# Patient Record
Sex: FEMALE | Race: WHITE | NOT HISPANIC OR LATINO | Employment: OTHER | URBAN - METROPOLITAN AREA
[De-identification: names, ages, dates, MRNs, and addresses within clinical notes are randomized per-mention and may not be internally consistent; named-entity substitution may affect disease eponyms.]

---

## 2017-03-01 ENCOUNTER — TELEPHONE (OUTPATIENT)
Dept: ORTHOPEDICS | Facility: CLINIC | Age: 57
End: 2017-03-01

## 2017-03-01 NOTE — TELEPHONE ENCOUNTER
I spoke with the patient and informed her that Dr. Moore was out of the office this week. I offered to make an appointment for when he returns but she declined and stated she been suffering with this for to long and wanted to see someone sooner.

## 2017-03-01 NOTE — TELEPHONE ENCOUNTER
----- Message from Iraida Salazar sent at 3/1/2017 10:38 AM [facility] -----  No. [phone]   New Patient has tennis elbow.  She would wilson an appointment today.  Please call.

## 2017-03-03 ENCOUNTER — TELEPHONE (OUTPATIENT)
Dept: ORTHOPEDICS | Facility: CLINIC | Age: 57
End: 2017-03-03

## 2017-03-03 NOTE — TELEPHONE ENCOUNTER
----- Message from Mariaelena Maya sent at 3/2/2017  2:03 PM CST -----  Contact: self, [phone]  Patient requests to speak with someone in your office to schedule an appointment. Please advise.

## 2017-03-03 NOTE — TELEPHONE ENCOUNTER
Spoke with pt, scheduled appt as requested. Pt provided with physical address. Pt placed on waiting list. Understanding verbalized.

## 2017-03-13 ENCOUNTER — OFFICE VISIT (OUTPATIENT)
Dept: ORTHOPEDICS | Facility: CLINIC | Age: 57
End: 2017-03-13
Payer: COMMERCIAL

## 2017-03-13 ENCOUNTER — HOSPITAL ENCOUNTER (OUTPATIENT)
Dept: RADIOLOGY | Facility: HOSPITAL | Age: 57
Discharge: HOME | End: 2017-03-13
Attending: ORTHOPAEDIC SURGERY
Payer: COMMERCIAL

## 2017-03-13 VITALS — WEIGHT: 148 LBS | HEIGHT: 64 IN | BODY MASS INDEX: 25.27 KG/M2

## 2017-03-13 DIAGNOSIS — M25.522 PAIN OF BOTH ELBOWS: Primary | ICD-10-CM

## 2017-03-13 DIAGNOSIS — M25.521 PAIN OF BOTH ELBOWS: ICD-10-CM

## 2017-03-13 DIAGNOSIS — M77.12 LATERAL EPICONDYLITIS OF BOTH ELBOWS: ICD-10-CM

## 2017-03-13 DIAGNOSIS — M25.522 PAIN OF BOTH ELBOWS: ICD-10-CM

## 2017-03-13 DIAGNOSIS — M77.11 LATERAL EPICONDYLITIS OF BOTH ELBOWS: ICD-10-CM

## 2017-03-13 DIAGNOSIS — M25.521 PAIN OF BOTH ELBOWS: Primary | ICD-10-CM

## 2017-03-13 PROCEDURE — 73070 X-RAY EXAM OF ELBOW: CPT | Mod: TC,50

## 2017-03-13 PROCEDURE — 73070 X-RAY EXAM OF ELBOW: CPT | Mod: 26,50,, | Performed by: RADIOLOGY

## 2017-03-13 PROCEDURE — 99203 OFFICE O/P NEW LOW 30 MIN: CPT | Mod: 25,S$GLB,, | Performed by: ORTHOPAEDIC SURGERY

## 2017-03-13 PROCEDURE — 20551 NJX 1 TENDON ORIGIN/INSJ: CPT | Mod: LT,S$GLB,, | Performed by: ORTHOPAEDIC SURGERY

## 2017-03-13 PROCEDURE — 1160F RVW MEDS BY RX/DR IN RCRD: CPT | Mod: S$GLB,,, | Performed by: ORTHOPAEDIC SURGERY

## 2017-03-13 PROCEDURE — 99999 PR PBB SHADOW E&M-EST. PATIENT-LVL II: CPT | Mod: PBBFAC,,, | Performed by: ORTHOPAEDIC SURGERY

## 2017-03-13 RX ORDER — CELECOXIB 200 MG/1
200 CAPSULE ORAL DAILY
Qty: 30 CAPSULE | Refills: 3 | Status: SHIPPED | OUTPATIENT
Start: 2017-03-13 | End: 2017-04-13

## 2017-03-13 RX ORDER — TRIAMCINOLONE ACETONIDE 40 MG/ML
40 INJECTION, SUSPENSION INTRA-ARTICULAR; INTRAMUSCULAR
Status: COMPLETED | OUTPATIENT
Start: 2017-03-13 | End: 2017-03-13

## 2017-03-13 RX ADMIN — TRIAMCINOLONE ACETONIDE 40 MG: 40 INJECTION, SUSPENSION INTRA-ARTICULAR; INTRAMUSCULAR at 02:03

## 2017-03-13 NOTE — PROGRESS NOTES
INITIAL VISIT HISTORY:  A 56-year-old female presents for evaluation of   bilateral elbow pain for the past year or so.  Symptoms have gotten worse   lately, especially since she has been doing a lot of Pilates and yoga.  Left   elbow seems to be worse than the right and although it does seem to change sides   from time to time.  No numbness or tingling is reported.  No trauma reported.    PAST MEDICAL HISTORY:  Unremarkable.    PAST SURGICAL HISTORY:  Includes hysterectomy.    FAMILY HISTORY:  Negative.    SOCIAL HISTORY:  The patient is a former smoker, drinks alcohol occasionally.    REVIEW OF SYSTEMS:  Negative fever, chills, rashes.    CURRENT MEDICATIONS:  Reviewed on chart.    ALLERGIES:  Benadryl.    PHYSICAL EXAMINATION:  GENERAL:  Well-developed, well-nourished female in no acute distress, alert and   oriented x3.  EXTREMITIES:  Examination of the upper extremities significant for the elbows,   left elbow, demonstrating tenderness laterally over the lateral epicondylar   area.  Full range of motion, no instability, no clicking, some pain with   resisted wrist extension noted.  The right elbow demonstrates tenderness mostly   medially.  Again, no swelling, full range of motion, no instability.  Good   strength.  Tinel sign is negative over the ulnar nerve on both elbows.    Distally full range of motion in wrist and fingers.  Sensation intact in all   digits.    X-RAYS:  AP and lateral, bilateral elbows, demonstrate some slight   calcifications noted in both elbows mostly laterally.    IMPRESSION:  Lateral epicondylitis, bilateral with small calcifications.    PLAN:  I explained the nature of problem to the patient.  Recommended and   performed injections each elbow, the left elbow laterally, the right elbow   medially in each case with combination of Kenalog 10 mg, 0.5 mL Xylocaine,   sterile technique.    I have also recommended some anti-inflammatory medication by mouth and   modification of her workout  routine, so she puts a little bit less stress on the   elbows.  Follow up in 1-2 months for recheck.      ROSANNA  dd: 03/13/2017 14:59:15 (CDT)  td: 03/14/2017 07:35:52 (CDT)  Doc ID   #[id number]  Job ID #[id number]    CC:

## 2017-03-13 NOTE — LETTER
March 13, 2017        Marcel Brown MD  [address]  [place] [address]             [facility]  [address] [place] [address] [address]  [facility] [address]  Phone: [phone]   Patient: Mago Cleaning   MR Number: [id number]   YOB: 1960   Date of Visit: 3/13/2017       Dear Dr. Brown:    Thank you for referring Mago Cleaning to me for evaluation. Below are the relevant portions of my assessment and plan of care.            If you have questions, please do not hesitate to call me. I look forward to following Mago along with you.    Sincerely,      Grant Moore Jr., MD           CC  No Recipients

## 2017-03-28 ENCOUNTER — TELEPHONE (OUTPATIENT)
Dept: SMOKING CESSATION | Facility: CLINIC | Age: 57
End: 2017-03-28

## 2017-04-13 ENCOUNTER — OFFICE VISIT (OUTPATIENT)
Dept: ORTHOPEDICS | Facility: CLINIC | Age: 57
End: 2017-04-13
Payer: COMMERCIAL

## 2017-04-13 VITALS — WEIGHT: 147.94 LBS | BODY MASS INDEX: 25.25 KG/M2 | HEIGHT: 64 IN

## 2017-04-13 DIAGNOSIS — M77.12 LATERAL EPICONDYLITIS OF BOTH ELBOWS: Primary | ICD-10-CM

## 2017-04-13 DIAGNOSIS — M77.11 LATERAL EPICONDYLITIS OF BOTH ELBOWS: Primary | ICD-10-CM

## 2017-04-13 PROCEDURE — 99213 OFFICE O/P EST LOW 20 MIN: CPT | Mod: S$GLB,,, | Performed by: ORTHOPAEDIC SURGERY

## 2017-04-13 PROCEDURE — 1160F RVW MEDS BY RX/DR IN RCRD: CPT | Mod: S$GLB,,, | Performed by: ORTHOPAEDIC SURGERY

## 2017-04-13 PROCEDURE — 99999 PR PBB SHADOW E&M-EST. PATIENT-LVL II: CPT | Mod: PBBFAC,,, | Performed by: ORTHOPAEDIC SURGERY

## 2017-04-13 RX ORDER — DICLOFENAC SODIUM 10 MG/G
2 GEL TOPICAL 3 TIMES DAILY
Qty: 1 TUBE | Refills: 3 | Status: SHIPPED | OUTPATIENT
Start: 2017-04-13 | End: 2023-12-20

## 2017-04-13 NOTE — PROGRESS NOTES
HISTORY OF PRESENT ILLNESS:  Ms. Cleaning in followup of bilateral elbow   symptoms related to epicondylitis.  She is doing better.  The last injections   helped out quite a bit.  We performed injections each elbow.    She just has occasional soreness bilaterally, but not too bad.  Symptoms worse   with overuse.  No numbness or tingling reported.  No trauma reported.    PHYSICAL EXAMINATION:  Both elbows look good.  Full range of motion.  Really no   tenderness either elbow.    IMPRESSION:  Bilateral elbow pain, improved.    PLAN:  I have recommended she just keep an eye on symptoms for now.  I have   ordered some Voltaren gel topically especially since she wants to get away from   the Celebrex because of the possible side effects.  So, we will discontinue the   Celebrex.  We will start some Voltaren gel topically and just have her keep an   eye on symptoms.  Follow up in a month or two, and if symptoms get worse, then   we may repeat the injections at that time.      ROSANNA  dd: 04/13/2017 11:18:04 (CDT)  td: 04/14/2017 02:24:13 (CDT)  Doc ID   #[id number]  Job ID #[id number]    CC:

## 2017-04-26 ENCOUNTER — TELEPHONE (OUTPATIENT)
Dept: ORTHOPEDICS | Facility: CLINIC | Age: 57
End: 2017-04-26

## 2017-04-26 NOTE — TELEPHONE ENCOUNTER
----- Message from Maria Fernanda Luo sent at 4/26/2017 10:54 AM CDT -----  Contact: self/[phone]  Patient called to ask about the shot.  Please advise.

## 2017-05-02 ENCOUNTER — OFFICE VISIT (OUTPATIENT)
Dept: ORTHOPEDICS | Facility: CLINIC | Age: 57
End: 2017-05-02
Payer: COMMERCIAL

## 2017-05-02 VITALS — WEIGHT: 147 LBS | BODY MASS INDEX: 25.1 KG/M2 | HEIGHT: 64 IN

## 2017-05-02 DIAGNOSIS — M77.12 LATERAL EPICONDYLITIS OF LEFT ELBOW: Primary | ICD-10-CM

## 2017-05-02 DIAGNOSIS — M77.11 LATERAL EPICONDYLITIS OF BOTH ELBOWS: ICD-10-CM

## 2017-05-02 DIAGNOSIS — M77.12 LATERAL EPICONDYLITIS OF BOTH ELBOWS: ICD-10-CM

## 2017-05-02 PROCEDURE — 99999 PR PBB SHADOW E&M-EST. PATIENT-LVL II: CPT | Mod: PBBFAC,,, | Performed by: ORTHOPAEDIC SURGERY

## 2017-05-02 PROCEDURE — 99213 OFFICE O/P EST LOW 20 MIN: CPT | Mod: 25,S$GLB,, | Performed by: ORTHOPAEDIC SURGERY

## 2017-05-02 PROCEDURE — 20551 NJX 1 TENDON ORIGIN/INSJ: CPT | Mod: LT,S$GLB,, | Performed by: ORTHOPAEDIC SURGERY

## 2017-05-02 PROCEDURE — 1160F RVW MEDS BY RX/DR IN RCRD: CPT | Mod: S$GLB,,, | Performed by: ORTHOPAEDIC SURGERY

## 2017-05-02 RX ORDER — TRIAMCINOLONE ACETONIDE 40 MG/ML
40 INJECTION, SUSPENSION INTRA-ARTICULAR; INTRAMUSCULAR
Status: DISCONTINUED | OUTPATIENT
Start: 2017-05-02 | End: 2017-05-02

## 2017-05-02 RX ORDER — TRIAMCINOLONE ACETONIDE 40 MG/ML
40 INJECTION, SUSPENSION INTRA-ARTICULAR; INTRAMUSCULAR
Status: COMPLETED | OUTPATIENT
Start: 2017-05-02 | End: 2017-05-02

## 2017-05-02 RX ADMIN — TRIAMCINOLONE ACETONIDE 40 MG: 40 INJECTION, SUSPENSION INTRA-ARTICULAR; INTRAMUSCULAR at 09:05

## 2017-05-02 NOTE — PROGRESS NOTES
HISTORY OF PRESENT ILLNESS:  Ms. Cleaning in followup of bilateral elbow pain   related to epicondylitis.  She had an injection a few months ago with excellent   results of the right elbow, but still having some pain in the left elbow.    PHYSICAL EXAMINATION:  RIGHT ELBOW:  No tenderness.  Full range of motion.  LEFT ELBOW:  Demonstrates tenderness laterally, slight swelling and some pain on   terminal extension.  No instability.  Strength is a little bit weak on the left   compared to the right.    IMPRESSION:  Lateral epicondylitis, left elbow.    PLAN:  I have recommended and performed an injection left elbow, combination of   Kenalog 10 mg, 0.5 mL Xylocaine, sterile technique.  I think we should also   follow this up with some therapy, so I have ordered some therapy for the left   elbow for stretching, strengthening and modality treatments, continue   anti-inflammatory medication by mouth and some Ultram given for pain.  Follow up   in six weeks.      ROSANNA  dd: 05/02/2017 09:23:08 (CDT)  td: 05/03/2017 07:06:05 (CDT)  Doc ID   #[id number]  Job ID #[id number]    CC:

## 2017-06-13 ENCOUNTER — TELEPHONE (OUTPATIENT)
Dept: ORTHOPEDICS | Facility: CLINIC | Age: 57
End: 2017-06-13

## 2017-06-13 ENCOUNTER — OFFICE VISIT (OUTPATIENT)
Dept: ORTHOPEDICS | Facility: CLINIC | Age: 57
End: 2017-06-13
Payer: COMMERCIAL

## 2017-06-13 VITALS — WEIGHT: 147 LBS | HEIGHT: 64 IN | BODY MASS INDEX: 25.1 KG/M2

## 2017-06-13 DIAGNOSIS — M77.12 LATERAL EPICONDYLITIS OF BOTH ELBOWS: ICD-10-CM

## 2017-06-13 DIAGNOSIS — M77.11 LATERAL EPICONDYLITIS OF BOTH ELBOWS: ICD-10-CM

## 2017-06-13 DIAGNOSIS — M25.522 LEFT ELBOW PAIN: Primary | ICD-10-CM

## 2017-06-13 PROCEDURE — 99213 OFFICE O/P EST LOW 20 MIN: CPT | Mod: S$GLB,,, | Performed by: ORTHOPAEDIC SURGERY

## 2017-06-13 PROCEDURE — 99999 PR PBB SHADOW E&M-EST. PATIENT-LVL II: CPT | Mod: PBBFAC,,, | Performed by: ORTHOPAEDIC SURGERY

## 2017-06-13 NOTE — TELEPHONE ENCOUNTER
Spoke with patient to confirm appointment time. Patient verbalized understanding.    ----- Message from Iraida Salazar sent at 6/13/2017  8:42 AM CDT -----  No. [phone]    Patient returned your call.

## 2017-06-13 NOTE — PROGRESS NOTES
Ms. Cleaning in followup of bilateral elbow symptoms related to lateral   epicondylitis.  She is doing well with the right elbow, but continues to have   problems with the left elbow.  She has had two injections and tried the Voltaren   gel and some stretching exercises without improvement.  She is concerned about   ongoing symptoms, we have discussed surgery previously.    PHYSICAL EXAMINATION:  There is some tenderness laterally.  No swelling, full   range of motion, no instability.  A little bit of pain with resisted wrist   extension.  Tinel sign negative.    IMPRESSION:  Probable lateral epicondylitis left elbow, unresponsive to   conservative treatment.    PLAN:  We did discuss surgery as an option today, but the patient is very   reluctant to proceed with surgery.  I think she is interested in MRI and we   discussed that as well.  This could give us more information about the   epicondylitis, or whether she has significant problem that would require   surgery.  She is in agreement.  We will set this up as an MRI of left elbow.  In   the meantime, continue anti-inflammatory medication by mouth and stretching   exercises.  Followup after MRI is complete.      ROSANNA  dd: 06/13/2017 10:08:55 (CDT)  td: 06/14/2017 06:19:26 (CDT)  Doc ID   #[id number]  Job ID #[id number]    CC:

## 2017-06-14 ENCOUNTER — TELEPHONE (OUTPATIENT)
Dept: ORTHOPEDICS | Facility: CLINIC | Age: 57
End: 2017-06-14

## 2017-06-14 NOTE — TELEPHONE ENCOUNTER
I spoke with the patient and she informed me that [facility] wanted $1800 for her MRI. She requested for the order be faxed to her so she could go somewhere else. It was faxed to [email or web address].

## 2017-06-14 NOTE — TELEPHONE ENCOUNTER
----- Message from Irasema Preston sent at 6/14/2017  2:04 PM CDT -----  Contact: [phone]/self  Patient requesting to speak with you regarding external orders of MRI. Please advise.

## 2017-06-20 ENCOUNTER — OFFICE VISIT (OUTPATIENT)
Dept: ORTHOPEDICS | Facility: CLINIC | Age: 57
End: 2017-06-20
Payer: COMMERCIAL

## 2017-06-20 VITALS — BODY MASS INDEX: 25.1 KG/M2 | WEIGHT: 147 LBS | HEIGHT: 64 IN

## 2017-06-20 DIAGNOSIS — M77.12 LATERAL EPICONDYLITIS OF BOTH ELBOWS: Primary | ICD-10-CM

## 2017-06-20 DIAGNOSIS — M77.11 LATERAL EPICONDYLITIS OF BOTH ELBOWS: Primary | ICD-10-CM

## 2017-06-20 PROCEDURE — 99214 OFFICE O/P EST MOD 30 MIN: CPT | Mod: S$GLB,,, | Performed by: ORTHOPAEDIC SURGERY

## 2017-06-20 PROCEDURE — 99999 PR PBB SHADOW E&M-EST. PATIENT-LVL III: CPT | Mod: PBBFAC,,, | Performed by: ORTHOPAEDIC SURGERY

## 2017-06-20 NOTE — PROGRESS NOTES
CHIEF COMPLAINT:  Left tennis elbow.    HISTORY OF PRESENT ILLNESS:  A 56-year-old female with ongoing symptoms, left   elbow for about 9 to 10 months' duration.  We have tried several injections,   exercises, and a counterforce brace without improvement.  The patient had an MRI   recently left elbow, which showed evidence of micro-tear of the lateral complex   consistent with tennis elbow.  She would like to set up surgery for left elbow.    PAST MEDICAL HISTORY:  Unremarkable.    PAST SURGICAL HISTORY:  Includes hysterectomy.    FAMILY HISTORY:  Negative.    SOCIAL HISTORY:  The patient is a former smoker, drinks alcohol occasionally.    REVIEW OF SYSTEMS:  Negative for fever, chills, rashes.    CURRENT MEDICATIONS:  Reviewed on the chart.    ALLERGIES:  Benadryl.    PHYSICAL EXAMINATION:  GENERAL:  Well-developed, well-nourished female in no acute distress, alert and   oriented x3.  HEENT:  Unremarkable.  LUNGS:  Clear to auscultation.  HEART:  Regular rate and rhythm.  ABDOMEN:  Soft, nontender.  EXTREMITIES:  Significant for left elbow, demonstrating tenderness laterally   over the lateral epicondylar area.  Range of motion of left elbow is slightly   painful.  She has some pain on resisted wrist extension.  No instability.  No   tenderness medially.  Tinel's sign negative.    MRI as noted above.    IMPRESSION:  Lateral epicondylitis, chronic left elbow.    PLAN:  The patient has elected to proceed with surgery.  This will include   lateral epicondylar release and repair.  The risks and benefits of surgery   explained to the patient and she understands.    Additionally, she has been having some issues with her insurance company, so she   may choose to have this done outside of the [facility].  I have given her   the name of Dr. Claude Williams and if she chooses, she will follow up with him   either way.  I think she would benefit from the surgery and we discussed that   today.  She understands.      LILIA/ROSI   dd: 06/20/2017 09:47:58 (CDT)  td: 06/21/2017 03:08:53 (VANESA)  Doc ID   #[id number]  Job ID #[id number]    CC:

## 2017-06-26 ENCOUNTER — TELEPHONE (OUTPATIENT)
Dept: ORTHOPEDICS | Facility: CLINIC | Age: 57
End: 2017-06-26

## 2017-06-26 NOTE — TELEPHONE ENCOUNTER
I spoke with the patient and she stated she will cancel surgery due to her insurance. Mrs. Dixon was made aware of this.

## 2017-06-26 NOTE — TELEPHONE ENCOUNTER
----- Message from Irasema Preston sent at 6/26/2017  3:28 PM CDT -----  Contact: [phone]/self  Patient requesting to speak with you regarding canceling surgery. Please advise.

## 2017-07-12 ENCOUNTER — TELEPHONE (OUTPATIENT)
Dept: ORTHOPEDICS | Facility: CLINIC | Age: 57
End: 2017-07-12

## 2017-07-12 NOTE — TELEPHONE ENCOUNTER
----- Message from Nicolette Palomares sent at 7/12/2017 10:03 AM CDT -----  Contact: Self [phone]  Patient Returning Your Phone Call

## 2017-07-12 NOTE — TELEPHONE ENCOUNTER
I spoke with the patient and gave her the codes for her office visit and also the number to billing. She understood.

## 2018-04-25 ENCOUNTER — CLINICAL SUPPORT (OUTPATIENT)
Dept: SMOKING CESSATION | Facility: CLINIC | Age: 58
End: 2018-04-25
Payer: COMMERCIAL

## 2018-04-25 DIAGNOSIS — F17.200 NICOTINE DEPENDENCE: Primary | ICD-10-CM

## 2018-04-25 PROCEDURE — 99407 BEHAV CHNG SMOKING > 10 MIN: CPT | Mod: S$GLB,,, | Performed by: INTERNAL MEDICINE

## 2018-11-11 ENCOUNTER — OFFICE VISIT (OUTPATIENT)
Dept: URGENT CARE | Facility: CLINIC | Age: 58
End: 2018-11-11
Payer: COMMERCIAL

## 2018-11-11 VITALS
WEIGHT: 150 LBS | TEMPERATURE: 98 F | DIASTOLIC BLOOD PRESSURE: 74 MMHG | OXYGEN SATURATION: 97 % | HEART RATE: 59 BPM | RESPIRATION RATE: 18 BRPM | HEIGHT: 64 IN | SYSTOLIC BLOOD PRESSURE: 122 MMHG | BODY MASS INDEX: 25.61 KG/M2

## 2018-11-11 DIAGNOSIS — J06.9 UPPER RESPIRATORY TRACT INFECTION, UNSPECIFIED TYPE: Primary | ICD-10-CM

## 2018-11-11 LAB
CTP QC/QA: YES
FLUAV AG NPH QL: NEGATIVE
FLUBV AG NPH QL: NEGATIVE

## 2018-11-11 PROCEDURE — 87804 INFLUENZA ASSAY W/OPTIC: CPT | Mod: QW,S$GLB,, | Performed by: FAMILY MEDICINE

## 2018-11-11 PROCEDURE — 99213 OFFICE O/P EST LOW 20 MIN: CPT | Mod: S$GLB,,, | Performed by: FAMILY MEDICINE

## 2018-11-11 PROCEDURE — 3008F BODY MASS INDEX DOCD: CPT | Mod: CPTII,S$GLB,, | Performed by: FAMILY MEDICINE

## 2018-11-11 RX ORDER — VALACYCLOVIR HYDROCHLORIDE 1 G/1
TABLET, FILM COATED ORAL
COMMUNITY
Start: 2018-10-05

## 2018-11-11 RX ORDER — ESTRADIOL 1 MG/1
TABLET ORAL
COMMUNITY
Start: 2018-10-15 | End: 2023-06-13

## 2018-11-11 NOTE — PROGRESS NOTES
"Subjective:       Patient ID: Mago Cleaning is a 57 y.o. female.    Vitals:  height is 5' 4" (1.626 m) and weight is 68 kg (150 lb). Her oral temperature is 98.2 °F (36.8 °C). Her blood pressure is 122/74 and her pulse is 59 (abnormal). Her respiration is 18 and oxygen saturation is 97%.     Chief Complaint: Sinus Problem    This is a 57 y.o. female who presents today with a chief complaint of hoarseness and body aches. [place] [employer] [place] last week[place]end. Symptoms began Monday. She took 2 Aspirin 325 mg.       Sore Throat    This is a new problem. The current episode started in the past 7 days. The problem has been unchanged. Neither side of throat is experiencing more pain than the other. There has been no fever. Associated symptoms include congestion, coughing and a hoarse voice. Pertinent negatives include no abdominal pain, ear pain, headaches, plugged ear sensation, neck pain, shortness of breath, swollen glands or trouble swallowing. The treatment provided no relief.     Review of Systems   Constitution: Negative for chills, fever and malaise/fatigue.   HENT: Positive for congestion, hoarse voice and sore throat. Negative for ear pain and trouble swallowing.    Eyes: Negative for discharge and redness.   Cardiovascular: Negative for chest pain, dyspnea on exertion and leg swelling.   Respiratory: Positive for cough and sputum production. Negative for shortness of breath and wheezing.    Musculoskeletal: Negative for myalgias and neck pain.   Gastrointestinal: Negative for abdominal pain and nausea.   Neurological: Negative for headaches.       Objective:      Physical Exam   Constitutional: She appears well-developed and well-nourished.   HENT:   Head: Normocephalic and atraumatic.   Mouth/Throat: Posterior oropharyngeal erythema (mild) present.   Eyes: EOM are normal. Pupils are equal, round, and reactive to light.   Neck: Normal range of motion. Neck supple.   Cardiovascular: Normal rate and regular " rhythm.   Pulmonary/Chest: Effort normal and breath sounds normal.   Abdominal: Soft.   Lymphadenopathy:     She has no cervical adenopathy.   Nursing note and vitals reviewed.      Results for orders placed or performed in visit on 11/11/18   POCT Influenza A/B   Result Value Ref Range    Rapid Influenza A Ag Negative Negative    Rapid Influenza B Ag Negative Negative     Acceptable Yes      Assessment:       1. Upper respiratory tract infection, unspecified type        Plan:         Upper respiratory tract infection, unspecified type  -     POCT Influenza A/B    OTC analgesia. RTC prn worsening symptoms

## 2018-11-11 NOTE — PATIENT INSTRUCTIONS
Viral Upper Respiratory Illness (Adult)  You have a viral upper respiratory illness (URI), which is another term for the common cold. This illness is contagious during the first few days. It is spread through the air by coughing and sneezing. It may also be spread by direct contact (touching the sick person and then touching your own eyes, nose, or mouth). Frequent handwashing will decrease risk of spread. Most viral illnesses go away within 7 to 10 days with rest and simple home remedies. Sometimes the illness may last for several weeks. Antibiotics will not kill a virus, and they are generally not prescribed for this condition.    Home care  · If symptoms are severe, rest at home for the first 2 to 3 days. When you resume activity, don't let yourself get too tired.  · Avoid being exposed to cigarette smoke (yours or others).  · You may use acetaminophen or ibuprofen to control pain and fever, unless another medicine was prescribed. (Note: If you have chronic liver or kidney disease, have ever had a stomach ulcer or gastrointestinal bleeding, or are taking blood-thinning medicines, talk with your healthcare provider before using these medicines.) Aspirin should never be given to anyone under 18 years of age who is ill with a viral infection or fever. It may cause severe liver or brain damage.  · Your appetite may be poor, so a light diet is fine. Avoid dehydration by drinking 6 to 8 glasses of fluids per day (water, soft drinks, juices, tea, or soup). Extra fluids will help loosen secretions in the nose and lungs.  · Over-the-counter cold medicines will not shorten the length of time youre sick, but they may be helpful for the following symptoms: cough, sore throat, and nasal and sinus congestion. (Note: Do not use decongestants if you have high blood pressure.)  Follow-up care  Follow up with your healthcare provider, or as advised.  When to seek medical advice  Call your healthcare provider right away if any  of these occur:  · Cough with lots of colored sputum (mucus)  · Severe headache; face, neck, or ear pain  · Difficulty swallowing due to throat pain  · Fever of 100.4°F (38°C)  Call 911, or get immediate medical care  Call emergency services right away if any of these occur:  · Chest pain, shortness of breath, wheezing, or difficulty breathing  · Coughing up blood  · Inability to swallow due to throat pain  Date Last Reviewed: 9/13/2015  © 9259-3712 [employer]. [address] [place] [address]. All rights reserved. This information is not intended as a substitute for professional medical care. Always follow your healthcare professional's instructions.

## 2018-12-15 ENCOUNTER — OFFICE VISIT (OUTPATIENT)
Dept: URGENT CARE | Facility: CLINIC | Age: 58
End: 2018-12-15
Payer: COMMERCIAL

## 2018-12-15 VITALS
OXYGEN SATURATION: 98 % | HEIGHT: 64 IN | RESPIRATION RATE: 20 BRPM | HEART RATE: 61 BPM | DIASTOLIC BLOOD PRESSURE: 68 MMHG | TEMPERATURE: 98 F | WEIGHT: 150 LBS | SYSTOLIC BLOOD PRESSURE: 115 MMHG | BODY MASS INDEX: 25.61 KG/M2

## 2018-12-15 DIAGNOSIS — J06.9 UPPER RESPIRATORY TRACT INFECTION, UNSPECIFIED TYPE: Primary | ICD-10-CM

## 2018-12-15 DIAGNOSIS — J32.9 SINUSITIS, UNSPECIFIED CHRONICITY, UNSPECIFIED LOCATION: ICD-10-CM

## 2018-12-15 PROCEDURE — 3008F BODY MASS INDEX DOCD: CPT | Mod: CPTII,S$GLB,, | Performed by: PHYSICIAN ASSISTANT

## 2018-12-15 PROCEDURE — 99214 OFFICE O/P EST MOD 30 MIN: CPT | Mod: S$GLB,,, | Performed by: PHYSICIAN ASSISTANT

## 2018-12-15 NOTE — PATIENT INSTRUCTIONS
- Rest.    - Drink plenty of fluids.    - Tylenol or Ibuprofen as directed as needed for fever/pain.    - Take over-the-counter claritin, zyrtec, allegra, or xyzal as directed.  You may use a decongestant form (D) of this medication if you DO NOT have a history of hypertension or heart disease.  - Use over the counter Flonase as directed for sinus congestion and postnasal drip.  - use nasal saline prior to Flonase.  - Use Ocean Spray Nasal Saline 1-3 puffs each nostril every 2-3 hours then blow out onto tissue. This is to irrigate the nasal passage way to clear the sinus openings. Use until sinus problem resolved.   - Follow up with your PCP or specialty clinic as directed in the next 1-2 weeks if not improved or as needed.  You can call [phone] to schedule an appointment with the appropriate provider.    - Go to the ED if your symptoms worsen.  - You must understand that you have received an Urgent Care treatment only and that you may be released before all of your medical problems are known or treated.   - You, the patient, will arrange for follow up care as instructed.   - If your condition worsens or fails to improve we recommend that you receive another evaluation at the ER immediately or contact your PCP to discuss your concerns or return here.       Sinusitis (No Antibiotics)    The sinuses are air-filled spaces within the bones of the face. They connect to the inside of the nose. Sinusitis is an inflammation of the tissue lining the sinus cavity. Sinus inflammation can occur during a cold. It can also be due to allergies to pollens and other particles in the air. It can cause symptoms such as sinus congestion, headache, sore throat, facial swelling and fullness. It may also cause a low-grade fever. No infection is present, and no antibiotic treatment is needed.  Home care  · Drink plenty of water, hot tea, and other liquids. This may help thin mucus. It also may promote sinus drainage.  · Heat may  help soothe painful areas of the face. Use a towel soaked in hot water. Or,  the shower and direct the hot spray onto your face. Using a vaporizer along with a menthol rub at night may also help.   · An expectorant containing guaifenesin may help thin the mucus and promote drainage from the sinuses.  · Over-the-counter decongestants may be used unless a similar medicine was prescribed. Nasal sprays work the fastest. Use one that contains phenylephrine or oxymetazoline. First blow the nose gently. Then use the spray. Do not use these medicines more often than directed on the label or symptoms may get worse. You may also use tablets containing pseudoephedrine. Avoid products that combine ingredients, because side effects may be increased. Read labels. You can also ask the pharmacist for help. (NOTE: Persons with high blood pressure should not use decongestants. They can raise blood pressure.)  · Over-the-counter antihistamines may help if allergies contributed to your sinusitis.    · Use acetaminophen or ibuprofen to control pain, unless another pain medicine was prescribed. (If you have chronic liver or kidney disease or ever had a stomach ulcer, talk with your doctor before using these medicines. Aspirin should never be used in anyone under 18 years of age who is ill with a fever. It may cause severe liver damage.)  · Use nasal rinses or irrigation as instructed by your health care provider.  · Don't smoke. This can worsen symptoms.  Follow-up care  Follow up with your healthcare provider or our staff if you are not improving within the next week.  When to seek medical advice  Call your healthcare provider if any of these occur:  · Green or yellow discharge from the nose or into the throat  · Facial pain or headache becoming more severe  · Stiff neck  · Unusual drowsiness or confusion  · Swelling of the forehead or eyelids  · Vision problems, including blurred or double vision  · Fever of 100.4ºF (38ºC) or  higher, or as directed by your healthcare provider  · Seizure  · Breathing problems  · Symptoms not resolving within 10 days  Date Last Reviewed: 4/13/2015  © 4442-1510 [employer]. [address] [place] [address], [place] [address]. All rights reserved. This information is not intended as a substitute for professional medical care. Always follow your healthcare professional's instructions.        Viral Upper Respiratory Illness (Adult)  You have a viral upper respiratory illness (URI), which is another term for the common cold. This illness is contagious during the first few days. It is spread through the air by coughing and sneezing. It may also be spread by direct contact (touching the sick person and then touching your own eyes, nose, or mouth). Frequent handwashing will decrease risk of spread. Most viral illnesses go away within 7 to 10 days with rest and simple home remedies. Sometimes the illness may last for several weeks. Antibiotics will not kill a virus, and they are generally not prescribed for this condition.    Home care  · If symptoms are severe, rest at home for the first 2 to 3 days. When you resume activity, don't let yourself get too tired.  · Avoid being exposed to cigarette smoke (yours or others).  · You may use acetaminophen or ibuprofen to control pain and fever, unless another medicine was prescribed. (Note: If you have chronic liver or kidney disease, have ever had a stomach ulcer or gastrointestinal bleeding, or are taking blood-thinning medicines, talk with your healthcare provider before using these medicines.) Aspirin should never be given to anyone under 18 years of age who is ill with a viral infection or fever. It may cause severe liver or brain damage.  · Your appetite may be poor, so a light diet is fine. Avoid dehydration by drinking 6 to 8 glasses of fluids per day (water, soft drinks, juices, tea, or soup). Extra fluids will help loosen secretions in the nose and  lungs.  · Over-the-counter cold medicines will not shorten the length of time youre sick, but they may be helpful for the following symptoms: cough, sore throat, and nasal and sinus congestion. (Note: Do not use decongestants if you have high blood pressure.)  Follow-up care  Follow up with your healthcare provider, or as advised.  When to seek medical advice  Call your healthcare provider right away if any of these occur:  · Cough with lots of colored sputum (mucus)  · Severe headache; face, neck, or ear pain  · Difficulty swallowing due to throat pain  · Fever of 100.4°F (38°C)  Call 911, or get immediate medical care  Call emergency services right away if any of these occur:  · Chest pain, shortness of breath, wheezing, or difficulty breathing  · Coughing up blood  · Inability to swallow due to throat pain  Date Last Reviewed: 9/13/2015  © 7725-8831 [employer]. [address] [place] [address], [place] [address]. All rights reserved. This information is not intended as a substitute for professional medical care. Always follow your healthcare professional's instructions.

## 2018-12-15 NOTE — PROGRESS NOTES
"Subjective:       Patient ID: Mago Cleaning is a 57 y.o. female.    Vitals:  height is 5' 4" (1.626 m) and weight is 68 kg (150 lb). Her oral temperature is 97.5 °F (36.4 °C). Her blood pressure is 115/68 and her pulse is 61. Her respiration is 20 and oxygen saturation is 98%.     Chief Complaint: URI    This is a 57 y.o. female   with History reviewed. No pertinent past medical history.   and Past Surgical History:  No date: HYSTERECTOMY  who presents today with a chief complaint of cold symptoms she's had for the past two days.  She reports sinus congestion, body aches, and runny nose.  She says she is having difficulty breathing through her nose.  She denies fever.  She took alkaseltzer and antihistamines to help with her symptoms.       URI    This is a new problem. The current episode started in the past 7 days. The problem has been gradually worsening. There has been no fever. Associated symptoms include congestion, coughing, headaches, rhinorrhea and a sore throat. Pertinent negatives include no ear pain, nausea, rash, sinus pain, vomiting or wheezing. She has tried antihistamine (alkaseltzer ) for the symptoms. The treatment provided mild relief.       Constitution: Positive for chills and fatigue. Negative for sweating and fever.   HENT: Positive for congestion and sore throat. Negative for ear pain, sinus pain, sinus pressure and voice change.    Neck: Negative for painful lymph nodes.   Eyes: Negative for eye redness.   Respiratory: Positive for cough and sputum production. Negative for chest tightness, bloody sputum, COPD, shortness of breath, stridor, wheezing and asthma.    Gastrointestinal: Negative for nausea and vomiting.   Musculoskeletal: Positive for muscle ache.   Skin: Negative for rash and erythema.   Allergic/Immunologic: Negative for seasonal allergies and asthma.   Neurological: Positive for headaches.   Hematologic/Lymphatic: Negative for swollen lymph nodes.       Objective:    "   Physical Exam   Constitutional: She is oriented to person, place, and time. She appears well-developed and well-nourished.   HENT:   Head: Normocephalic and atraumatic.   Right Ear: Hearing, tympanic membrane, external ear and ear canal normal.   Left Ear: Hearing, tympanic membrane, external ear and ear canal normal.   Nose: Mucosal edema and rhinorrhea present. Right sinus exhibits no maxillary sinus tenderness and no frontal sinus tenderness. Left sinus exhibits no maxillary sinus tenderness and no frontal sinus tenderness.   Mouth/Throat: Uvula is midline and oropharynx is clear and moist.   Eyes: Conjunctivae are normal.   Neck: Normal range of motion. Neck supple. No thyromegaly present.   Cardiovascular: Normal rate and regular rhythm. Exam reveals no gallop and no friction rub.   No murmur heard.  Pulmonary/Chest: Effort normal and breath sounds normal. She has no wheezes. She has no rales.   Musculoskeletal: Normal range of motion.   Lymphadenopathy:     She has no cervical adenopathy.   Neurological: She is alert and oriented to person, place, and time.   Skin: Skin is warm and dry. No rash noted. No erythema.   Psychiatric: She has a normal mood and affect. Her behavior is normal. Judgment and thought content normal.   Nursing note and vitals reviewed.      Assessment:       1. Upper respiratory tract infection, unspecified type    2. Sinusitis, unspecified chronicity, unspecified location        Plan:         Upper respiratory tract infection, unspecified type    Sinusitis, unspecified chronicity, unspecified location        Mago was seen today for uri.    Diagnoses and all orders for this visit:    Upper respiratory tract infection, unspecified type    Sinusitis, unspecified chronicity, unspecified location      Patient Instructions   - Rest.    - Drink plenty of fluids.    - Tylenol or Ibuprofen as directed as needed for fever/pain.    - Take over-the-counter claritin, zyrtec, allegra, or xyzal as  directed.  You may use a decongestant form (D) of this medication if you DO NOT have a history of hypertension or heart disease.  - Use over the counter Flonase as directed for sinus congestion and postnasal drip.  - use nasal saline prior to Flonase.  - Use Ocean Spray Nasal Saline 1-3 puffs each nostril every 2-3 hours then blow out onto tissue. This is to irrigate the nasal passage way to clear the sinus openings. Use until sinus problem resolved.   - Follow up with your PCP or specialty clinic as directed in the next 1-2 weeks if not improved or as needed.  You can call [phone] to schedule an appointment with the appropriate provider.    - Go to the ED if your symptoms worsen.  - You must understand that you have received an Urgent Care treatment only and that you may be released before all of your medical problems are known or treated.   - You, the patient, will arrange for follow up care as instructed.   - If your condition worsens or fails to improve we recommend that you receive another evaluation at the ER immediately or contact your PCP to discuss your concerns or return here.       Sinusitis (No Antibiotics)    The sinuses are air-filled spaces within the bones of the face. They connect to the inside of the nose. Sinusitis is an inflammation of the tissue lining the sinus cavity. Sinus inflammation can occur during a cold. It can also be due to allergies to pollens and other particles in the air. It can cause symptoms such as sinus congestion, headache, sore throat, facial swelling and fullness. It may also cause a low-grade fever. No infection is present, and no antibiotic treatment is needed.  Home care  · Drink plenty of water, hot tea, and other liquids. This may help thin mucus. It also may promote sinus drainage.  · Heat may help soothe painful areas of the face. Use a towel soaked in hot water. Or,  the shower and direct the hot spray onto your face. Using a vaporizer along with a  menthol rub at night may also help.   · An expectorant containing guaifenesin may help thin the mucus and promote drainage from the sinuses.  · Over-the-counter decongestants may be used unless a similar medicine was prescribed. Nasal sprays work the fastest. Use one that contains phenylephrine or oxymetazoline. First blow the nose gently. Then use the spray. Do not use these medicines more often than directed on the label or symptoms may get worse. You may also use tablets containing pseudoephedrine. Avoid products that combine ingredients, because side effects may be increased. Read labels. You can also ask the pharmacist for help. (NOTE: Persons with high blood pressure should not use decongestants. They can raise blood pressure.)  · Over-the-counter antihistamines may help if allergies contributed to your sinusitis.    · Use acetaminophen or ibuprofen to control pain, unless another pain medicine was prescribed. (If you have chronic liver or kidney disease or ever had a stomach ulcer, talk with your doctor before using these medicines. Aspirin should never be used in anyone under 18 years of age who is ill with a fever. It may cause severe liver damage.)  · Use nasal rinses or irrigation as instructed by your health care provider.  · Don't smoke. This can worsen symptoms.  Follow-up care  Follow up with your healthcare provider or our staff if you are not improving within the next week.  When to seek medical advice  Call your healthcare provider if any of these occur:  · Green or yellow discharge from the nose or into the throat  · Facial pain or headache becoming more severe  · Stiff neck  · Unusual drowsiness or confusion  · Swelling of the forehead or eyelids  · Vision problems, including blurred or double vision  · Fever of 100.4ºF (38ºC) or higher, or as directed by your healthcare provider  · Seizure  · Breathing problems  · Symptoms not resolving within 10 days  Date Last Reviewed: 4/13/2015  © 5620-0650  [employer]. [address] [place] [address], [place] [address]. All rights reserved. This information is not intended as a substitute for professional medical care. Always follow your healthcare professional's instructions.        Viral Upper Respiratory Illness (Adult)  You have a viral upper respiratory illness (URI), which is another term for the common cold. This illness is contagious during the first few days. It is spread through the air by coughing and sneezing. It may also be spread by direct contact (touching the sick person and then touching your own eyes, nose, or mouth). Frequent handwashing will decrease risk of spread. Most viral illnesses go away within 7 to 10 days with rest and simple home remedies. Sometimes the illness may last for several weeks. Antibiotics will not kill a virus, and they are generally not prescribed for this condition.    Home care  · If symptoms are severe, rest at home for the first 2 to 3 days. When you resume activity, don't let yourself get too tired.  · Avoid being exposed to cigarette smoke (yours or others).  · You may use acetaminophen or ibuprofen to control pain and fever, unless another medicine was prescribed. (Note: If you have chronic liver or kidney disease, have ever had a stomach ulcer or gastrointestinal bleeding, or are taking blood-thinning medicines, talk with your healthcare provider before using these medicines.) Aspirin should never be given to anyone under 18 years of age who is ill with a viral infection or fever. It may cause severe liver or brain damage.  · Your appetite may be poor, so a light diet is fine. Avoid dehydration by drinking 6 to 8 glasses of fluids per day (water, soft drinks, juices, tea, or soup). Extra fluids will help loosen secretions in the nose and lungs.  · Over-the-counter cold medicines will not shorten the length of time youre sick, but they may be helpful for the following symptoms: cough, sore throat, and nasal and  sinus congestion. (Note: Do not use decongestants if you have high blood pressure.)  Follow-up care  Follow up with your healthcare provider, or as advised.  When to seek medical advice  Call your healthcare provider right away if any of these occur:  · Cough with lots of colored sputum (mucus)  · Severe headache; face, neck, or ear pain  · Difficulty swallowing due to throat pain  · Fever of 100.4°F (38°C)  Call 911, or get immediate medical care  Call emergency services right away if any of these occur:  · Chest pain, shortness of breath, wheezing, or difficulty breathing  · Coughing up blood  · Inability to swallow due to throat pain  Date Last Reviewed: 9/13/2015  © 1324-2787 [employer]. [address] [place] [address], [place] [address]. All rights reserved. This information is not intended as a substitute for professional medical care. Always follow your healthcare professional's instructions.

## 2019-10-14 ENCOUNTER — OFFICE VISIT (OUTPATIENT)
Dept: URGENT CARE | Facility: CLINIC | Age: 59
End: 2019-10-14
Payer: COMMERCIAL

## 2019-10-14 VITALS
HEART RATE: 64 BPM | RESPIRATION RATE: 20 BRPM | BODY MASS INDEX: 25.27 KG/M2 | TEMPERATURE: 99 F | OXYGEN SATURATION: 97 % | WEIGHT: 148 LBS | HEIGHT: 64 IN | DIASTOLIC BLOOD PRESSURE: 67 MMHG | SYSTOLIC BLOOD PRESSURE: 106 MMHG

## 2019-10-14 DIAGNOSIS — A08.4 VIRAL GASTROENTERITIS: Primary | ICD-10-CM

## 2019-10-14 LAB
CTP QC/QA: YES
FLUAV AG NPH QL: NEGATIVE
FLUBV AG NPH QL: NEGATIVE

## 2019-10-14 PROCEDURE — 99214 OFFICE O/P EST MOD 30 MIN: CPT | Mod: S$GLB,,, | Performed by: FAMILY MEDICINE

## 2019-10-14 PROCEDURE — 87804 POCT INFLUENZA A/B: ICD-10-PCS | Mod: 59,QW,S$GLB, | Performed by: FAMILY MEDICINE

## 2019-10-14 PROCEDURE — 3008F PR BODY MASS INDEX (BMI) DOCUMENTED: ICD-10-PCS | Mod: CPTII,S$GLB,, | Performed by: FAMILY MEDICINE

## 2019-10-14 PROCEDURE — 87804 INFLUENZA ASSAY W/OPTIC: CPT | Mod: QW,S$GLB,, | Performed by: FAMILY MEDICINE

## 2019-10-14 PROCEDURE — 3008F BODY MASS INDEX DOCD: CPT | Mod: CPTII,S$GLB,, | Performed by: FAMILY MEDICINE

## 2019-10-14 PROCEDURE — 99214 PR OFFICE/OUTPT VISIT, EST, LEVL IV, 30-39 MIN: ICD-10-PCS | Mod: S$GLB,,, | Performed by: FAMILY MEDICINE

## 2019-10-15 NOTE — PATIENT INSTRUCTIONS

## 2019-10-15 NOTE — PROGRESS NOTES
"Subjective:       Patient ID: Mago Cleaning is a 58 y.o. female.    Vitals:  height is 5' 4" (1.626 m) and weight is 67.1 kg (148 lb). Her oral temperature is 98.7 °F (37.1 °C). Her blood pressure is 106/67 and her pulse is 64. Her respiration is 20 and oxygen saturation is 97%.     Chief Complaint: URI    This is a 58 y.o. female who presents today with a chief complaint of flu like symptoms which started last night. Patient states she has body aches, nausea, chills, and ABD pain.  She has taken Vanquish with relief.   She received her flu shot on October 2.     URI    This is a new problem. The current episode started yesterday. The problem has been gradually worsening. There has been no fever. Associated symptoms include abdominal pain and nausea. Pertinent negatives include no congestion, coughing, ear pain, rash, sinus pain, sore throat, vomiting or wheezing. Treatments tried: Vanquish. The treatment provided moderate relief.       Constitution: Positive for chills. Negative for sweating, fatigue and fever.   HENT: Negative for ear pain, congestion, sinus pain, sinus pressure, sore throat and voice change.    Neck: Negative for painful lymph nodes.   Eyes: Negative for eye redness.   Respiratory: Negative for chest tightness, cough, sputum production, bloody sputum, COPD, shortness of breath, stridor, wheezing and asthma.    Gastrointestinal: Positive for abdominal pain and nausea. Negative for vomiting.   Musculoskeletal: Positive for muscle ache.   Skin: Negative for rash.   Allergic/Immunologic: Positive for flu shot. Negative for seasonal allergies and asthma.   Hematologic/Lymphatic: Negative for swollen lymph nodes.       Objective:      Physical Exam   Constitutional: She appears well-developed and well-nourished.   HENT:   Head: Normocephalic and atraumatic.   Eyes: Pupils are equal, round, and reactive to light. EOM are normal.   Neck: Normal range of motion. Neck supple.   Cardiovascular: Normal " rate, regular rhythm and normal heart sounds.   Pulmonary/Chest: Effort normal and breath sounds normal.   Abdominal: Soft. There is no tenderness.   Nursing note and vitals reviewed.    Results for orders placed or performed in visit on 10/14/19   POCT Influenza A/B   Result Value Ref Range    Rapid Influenza A Ag Negative Negative    Rapid Influenza B Ag Negative Negative     Acceptable Yes          Assessment:       1. Viral gastroenteritis        Plan:         Viral gastroenteritis  -     POCT Influenza A/B    Fluids, BRAT diet. RTC as needed

## 2021-10-05 ENCOUNTER — ANESTHESIA EVENT (OUTPATIENT)
Dept: SURGERY | Facility: OTHER | Age: 61
End: 2021-10-05
Payer: COMMERCIAL

## 2021-10-05 ENCOUNTER — HOSPITAL ENCOUNTER (OUTPATIENT)
Dept: PREADMISSION TESTING | Facility: OTHER | Age: 61
Discharge: HOME | End: 2021-10-05
Attending: ORTHOPAEDIC SURGERY
Payer: COMMERCIAL

## 2021-10-05 VITALS
OXYGEN SATURATION: 97 % | HEIGHT: 64 IN | TEMPERATURE: 98 F | BODY MASS INDEX: 23.9 KG/M2 | WEIGHT: 140 LBS | HEART RATE: 60 BPM | DIASTOLIC BLOOD PRESSURE: 63 MMHG | RESPIRATION RATE: 16 BRPM | SYSTOLIC BLOOD PRESSURE: 133 MMHG

## 2021-10-05 DIAGNOSIS — Z01.818 PRE-OP TESTING: ICD-10-CM

## 2021-10-05 LAB
ANION GAP SERPL CALC-SCNC: 9 MMOL/L (ref 8–16)
BASOPHILS # BLD AUTO: 0.04 K/UL (ref 0–0.2)
BASOPHILS NFR BLD: 0.5 % (ref 0–1.9)
BILIRUB UR QL STRIP: NEGATIVE
BUN SERPL-MCNC: 15 MG/DL (ref 8–23)
CALCIUM SERPL-MCNC: 9.2 MG/DL (ref 8.7–10.5)
CHLORIDE SERPL-SCNC: 99 MMOL/L (ref 95–110)
CLARITY UR: CLEAR
CO2 SERPL-SCNC: 29 MMOL/L (ref 23–29)
COLOR UR: YELLOW
CREAT SERPL-MCNC: 0.7 MG/DL (ref 0.5–1.4)
DIFFERENTIAL METHOD: ABNORMAL
EOSINOPHIL # BLD AUTO: 0.1 K/UL (ref 0–0.5)
EOSINOPHIL NFR BLD: 1.1 % (ref 0–8)
ERYTHROCYTE [DISTWIDTH] IN BLOOD BY AUTOMATED COUNT: 13.2 % (ref 11.5–14.5)
EST. GFR  (AFRICAN AMERICAN): >60 ML/MIN/1.73 M^2
EST. GFR  (NON AFRICAN AMERICAN): >60 ML/MIN/1.73 M^2
GLUCOSE SERPL-MCNC: 100 MG/DL (ref 70–110)
GLUCOSE UR QL STRIP: NEGATIVE
HCT VFR BLD AUTO: 40.5 % (ref 37–48.5)
HGB BLD-MCNC: 13 G/DL (ref 12–16)
HGB UR QL STRIP: NEGATIVE
IMM GRANULOCYTES # BLD AUTO: 0.02 K/UL (ref 0–0.04)
IMM GRANULOCYTES NFR BLD AUTO: 0.3 % (ref 0–0.5)
KETONES UR QL STRIP: NEGATIVE
LEUKOCYTE ESTERASE UR QL STRIP: NEGATIVE
LYMPHOCYTES # BLD AUTO: 2.1 K/UL (ref 1–4.8)
LYMPHOCYTES NFR BLD: 28.2 % (ref 18–48)
MCH RBC QN AUTO: 31.5 PG (ref 27–31)
MCHC RBC AUTO-ENTMCNC: 32.1 G/DL (ref 32–36)
MCV RBC AUTO: 98 FL (ref 82–98)
MONOCYTES # BLD AUTO: 0.6 K/UL (ref 0.3–1)
MONOCYTES NFR BLD: 8.6 % (ref 4–15)
NEUTROPHILS # BLD AUTO: 4.5 K/UL (ref 1.8–7.7)
NEUTROPHILS NFR BLD: 61.3 % (ref 38–73)
NITRITE UR QL STRIP: NEGATIVE
NRBC BLD-RTO: 0 /100 WBC
PH UR STRIP: 8 [PH] (ref 5–8)
PLATELET # BLD AUTO: 227 K/UL (ref 150–450)
PMV BLD AUTO: 10.7 FL (ref 9.2–12.9)
POTASSIUM SERPL-SCNC: 4.3 MMOL/L (ref 3.5–5.1)
PROT UR QL STRIP: NEGATIVE
RBC # BLD AUTO: 4.13 M/UL (ref 4–5.4)
SODIUM SERPL-SCNC: 137 MMOL/L (ref 136–145)
SP GR UR STRIP: 1.01 (ref 1–1.03)
URN SPEC COLLECT METH UR: NORMAL
UROBILINOGEN UR STRIP-ACNC: NEGATIVE EU/DL
WBC # BLD AUTO: 7.4 K/UL (ref 3.9–12.7)

## 2021-10-05 PROCEDURE — 85025 COMPLETE CBC W/AUTO DIFF WBC: CPT | Performed by: ORTHOPAEDIC SURGERY

## 2021-10-05 PROCEDURE — 81003 URINALYSIS AUTO W/O SCOPE: CPT | Performed by: ORTHOPAEDIC SURGERY

## 2021-10-05 PROCEDURE — 80048 BASIC METABOLIC PNL TOTAL CA: CPT | Performed by: ORTHOPAEDIC SURGERY

## 2021-10-05 PROCEDURE — U0005 INFEC AGEN DETEC AMPLI PROBE: HCPCS | Performed by: ANESTHESIOLOGY

## 2021-10-05 PROCEDURE — 36415 COLL VENOUS BLD VENIPUNCTURE: CPT | Performed by: ORTHOPAEDIC SURGERY

## 2021-10-05 PROCEDURE — U0003 INFECTIOUS AGENT DETECTION BY NUCLEIC ACID (DNA OR RNA); SEVERE ACUTE RESPIRATORY SYNDROME CORONAVIRUS 2 (SARS-COV-2) (CORONAVIRUS DISEASE [COVID-19]), AMPLIFIED PROBE TECHNIQUE, MAKING USE OF HIGH THROUGHPUT TECHNOLOGIES AS DESCRIBED BY CMS-2020-01-R: HCPCS | Performed by: ANESTHESIOLOGY

## 2021-10-05 RX ORDER — ESCITALOPRAM OXALATE 10 MG/1
10 TABLET ORAL DAILY
COMMUNITY
End: 2023-12-20

## 2021-10-05 RX ORDER — CHOLECALCIFEROL (VITAMIN D3) 25 MCG
1000 TABLET ORAL DAILY
COMMUNITY
End: 2023-12-20

## 2021-10-05 RX ORDER — VITAMIN B COMPLEX
1 CAPSULE ORAL DAILY
COMMUNITY

## 2021-10-05 RX ORDER — SODIUM CHLORIDE, SODIUM LACTATE, POTASSIUM CHLORIDE, CALCIUM CHLORIDE 600; 310; 30; 20 MG/100ML; MG/100ML; MG/100ML; MG/100ML
INJECTION, SOLUTION INTRAVENOUS CONTINUOUS
Status: CANCELLED | OUTPATIENT
Start: 2021-10-05

## 2021-10-05 RX ORDER — SELENIUM 50 MCG
200 TABLET ORAL DAILY
COMMUNITY

## 2021-10-05 RX ORDER — UBIDECARENONE 30 MG
100 CAPSULE ORAL 3 TIMES DAILY
COMMUNITY

## 2021-10-05 RX ORDER — ZINC GLUCONATE 50 MG
50 TABLET ORAL DAILY
COMMUNITY

## 2021-10-05 RX ORDER — LIDOCAINE HYDROCHLORIDE 10 MG/ML
0.5 INJECTION, SOLUTION EPIDURAL; INFILTRATION; INTRACAUDAL; PERINEURAL ONCE
Status: CANCELLED | OUTPATIENT
Start: 2021-10-05 | End: 2021-10-05

## 2021-10-05 RX ORDER — ACETAMINOPHEN 500 MG
1000 TABLET ORAL
Status: CANCELLED | OUTPATIENT
Start: 2021-10-05 | End: 2021-10-05

## 2021-10-05 RX ORDER — MELOXICAM 15 MG/1
15 TABLET ORAL DAILY
COMMUNITY
End: 2023-12-20

## 2021-10-05 RX ORDER — BISACODYL 5 MG
5 TABLET, DELAYED RELEASE (ENTERIC COATED) ORAL DAILY PRN
COMMUNITY
End: 2023-12-20

## 2021-10-05 RX ORDER — TRAMADOL HYDROCHLORIDE 100 MG/1
50 TABLET, EXTENDED RELEASE ORAL DAILY
Status: ON HOLD | COMMUNITY
End: 2021-10-08 | Stop reason: HOSPADM

## 2021-10-06 LAB
SARS-COV-2 RNA RESP QL NAA+PROBE: NOT DETECTED
SARS-COV-2- CYCLE NUMBER: NORMAL

## 2021-10-08 ENCOUNTER — ANESTHESIA (OUTPATIENT)
Dept: SURGERY | Facility: OTHER | Age: 61
End: 2021-10-08
Payer: COMMERCIAL

## 2021-10-08 ENCOUNTER — HOSPITAL ENCOUNTER (OUTPATIENT)
Facility: OTHER | Age: 61
Discharge: HOME HEALTH | End: 2021-10-09
Attending: ORTHOPAEDIC SURGERY | Admitting: ORTHOPAEDIC SURGERY
Payer: COMMERCIAL

## 2021-10-08 DIAGNOSIS — M16.9 OA (OSTEOARTHRITIS) OF HIP: ICD-10-CM

## 2021-10-08 PROCEDURE — 97116 GAIT TRAINING THERAPY: CPT

## 2021-10-08 PROCEDURE — 97110 THERAPEUTIC EXERCISES: CPT

## 2021-10-08 PROCEDURE — 88304 TISSUE EXAM BY PATHOLOGIST: CPT | Performed by: PATHOLOGY

## 2021-10-08 PROCEDURE — 88311 DECALCIFY TISSUE: CPT | Mod: 26,,, | Performed by: PATHOLOGY

## 2021-10-08 PROCEDURE — 88304 PR  SURG PATH,LEVEL III: ICD-10-PCS | Mod: 26,,, | Performed by: PATHOLOGY

## 2021-10-08 PROCEDURE — 63600175 PHARM REV CODE 636 W HCPCS: Performed by: ANESTHESIOLOGY

## 2021-10-08 PROCEDURE — 25000003 PHARM REV CODE 250: Performed by: ANESTHESIOLOGY

## 2021-10-08 PROCEDURE — 63600175 PHARM REV CODE 636 W HCPCS: Performed by: NURSE ANESTHETIST, CERTIFIED REGISTERED

## 2021-10-08 PROCEDURE — 63600175 PHARM REV CODE 636 W HCPCS: Performed by: SPECIALIST

## 2021-10-08 PROCEDURE — 71000039 HC RECOVERY, EACH ADD'L HOUR: Performed by: ORTHOPAEDIC SURGERY

## 2021-10-08 PROCEDURE — 88304 TISSUE EXAM BY PATHOLOGIST: CPT | Mod: 26,,, | Performed by: PATHOLOGY

## 2021-10-08 PROCEDURE — 63600175 PHARM REV CODE 636 W HCPCS: Performed by: ORTHOPAEDIC SURGERY

## 2021-10-08 PROCEDURE — 36000711: Performed by: ORTHOPAEDIC SURGERY

## 2021-10-08 PROCEDURE — 25000003 PHARM REV CODE 250: Performed by: NURSE ANESTHETIST, CERTIFIED REGISTERED

## 2021-10-08 PROCEDURE — 97161 PT EVAL LOW COMPLEX 20 MIN: CPT

## 2021-10-08 PROCEDURE — 63600175 PHARM REV CODE 636 W HCPCS

## 2021-10-08 PROCEDURE — 37000008 HC ANESTHESIA 1ST 15 MINUTES: Performed by: ORTHOPAEDIC SURGERY

## 2021-10-08 PROCEDURE — 99900035 HC TECH TIME PER 15 MIN (STAT)

## 2021-10-08 PROCEDURE — C1776 JOINT DEVICE (IMPLANTABLE): HCPCS | Performed by: ORTHOPAEDIC SURGERY

## 2021-10-08 PROCEDURE — 27201423 OPTIME MED/SURG SUP & DEVICES STERILE SUPPLY: Performed by: ORTHOPAEDIC SURGERY

## 2021-10-08 PROCEDURE — 37000009 HC ANESTHESIA EA ADD 15 MINS: Performed by: ORTHOPAEDIC SURGERY

## 2021-10-08 PROCEDURE — 97530 THERAPEUTIC ACTIVITIES: CPT

## 2021-10-08 PROCEDURE — 64450 NJX AA&/STRD OTHER PN/BRANCH: CPT | Mod: 59,RT | Performed by: ANESTHESIOLOGY

## 2021-10-08 PROCEDURE — 36000710: Performed by: ORTHOPAEDIC SURGERY

## 2021-10-08 PROCEDURE — 25000003 PHARM REV CODE 250: Performed by: ORTHOPAEDIC SURGERY

## 2021-10-08 PROCEDURE — 71000033 HC RECOVERY, INTIAL HOUR: Performed by: ORTHOPAEDIC SURGERY

## 2021-10-08 PROCEDURE — 88311 PR  DECALCIFY TISSUE: ICD-10-PCS | Mod: 26,,, | Performed by: PATHOLOGY

## 2021-10-08 DEVICE — ACTIS DUOFIX HIP PROSTHESIS (FEMORAL STEM 12/14 TAPER CEMENTLESS SIZE 4 HIGH COLLAR)  CE
Type: IMPLANTABLE DEVICE | Site: HIP | Status: FUNCTIONAL
Brand: ACTIS

## 2021-10-08 DEVICE — PINNACLE HIP SOLUTIONS ALTRX POLYETHYLENE ACETABULAR LINER NEUTRAL 32MM ID 48MM OD
Type: IMPLANTABLE DEVICE | Site: HIP | Status: FUNCTIONAL
Brand: PINNACLE ALTRX

## 2021-10-08 DEVICE — PINNACLE GRIPTION ACETABULAR SHELL SECTOR 48MM OD
Type: IMPLANTABLE DEVICE | Site: HIP | Status: FUNCTIONAL
Brand: PINNACLE GRIPTION

## 2021-10-08 DEVICE — BIOLOX DELTA TS CERAMIC FEMORAL HEAD 12/14 TAPER REVISION DIAMETER 32MM +1
Type: IMPLANTABLE DEVICE | Site: HIP | Status: FUNCTIONAL
Brand: BIOLOX DELTA

## 2021-10-08 RX ORDER — LIDOCAINE HYDROCHLORIDE 20 MG/ML
INJECTION INTRAVENOUS
Status: DISCONTINUED | OUTPATIENT
Start: 2021-10-08 | End: 2021-10-08

## 2021-10-08 RX ORDER — FAMOTIDINE 20 MG/1
20 TABLET, FILM COATED ORAL DAILY
Status: DISCONTINUED | OUTPATIENT
Start: 2021-10-08 | End: 2021-10-09 | Stop reason: HOSPADM

## 2021-10-08 RX ORDER — MIDAZOLAM HYDROCHLORIDE 1 MG/ML
5 INJECTION INTRAMUSCULAR; INTRAVENOUS ONCE AS NEEDED
Status: CANCELLED | OUTPATIENT
Start: 2021-10-08 | End: 2033-03-05

## 2021-10-08 RX ORDER — TRANEXAMIC ACID 100 MG/ML
INJECTION, SOLUTION INTRAVENOUS
Status: DISCONTINUED | OUTPATIENT
Start: 2021-10-08 | End: 2021-10-08 | Stop reason: HOSPADM

## 2021-10-08 RX ORDER — CEFAZOLIN SODIUM 2 G/50ML
2 SOLUTION INTRAVENOUS
Status: COMPLETED | OUTPATIENT
Start: 2021-10-08 | End: 2021-10-09

## 2021-10-08 RX ORDER — VALACYCLOVIR HYDROCHLORIDE 500 MG/1
500 TABLET, FILM COATED ORAL EVERY 12 HOURS
Status: DISCONTINUED | OUTPATIENT
Start: 2021-10-08 | End: 2021-10-09 | Stop reason: HOSPADM

## 2021-10-08 RX ORDER — MIDAZOLAM HYDROCHLORIDE 1 MG/ML
INJECTION INTRAMUSCULAR; INTRAVENOUS
Status: DISCONTINUED | OUTPATIENT
Start: 2021-10-08 | End: 2021-10-08

## 2021-10-08 RX ORDER — ONDANSETRON 8 MG/1
8 TABLET, ORALLY DISINTEGRATING ORAL EVERY 8 HOURS PRN
Status: DISCONTINUED | OUTPATIENT
Start: 2021-10-08 | End: 2021-10-09 | Stop reason: HOSPADM

## 2021-10-08 RX ORDER — FENTANYL CITRATE 50 UG/ML
INJECTION, SOLUTION INTRAMUSCULAR; INTRAVENOUS
Status: DISCONTINUED | OUTPATIENT
Start: 2021-10-08 | End: 2021-10-08

## 2021-10-08 RX ORDER — FENTANYL CITRATE 50 UG/ML
100 INJECTION, SOLUTION INTRAMUSCULAR; INTRAVENOUS EVERY 5 MIN PRN
Status: CANCELLED | OUTPATIENT
Start: 2021-10-08

## 2021-10-08 RX ORDER — CEFAZOLIN SODIUM 1 G/3ML
2 INJECTION, POWDER, FOR SOLUTION INTRAMUSCULAR; INTRAVENOUS
Status: COMPLETED | OUTPATIENT
Start: 2021-10-08 | End: 2021-10-08

## 2021-10-08 RX ORDER — ROPIVACAINE HYDROCHLORIDE 5 MG/ML
INJECTION, SOLUTION EPIDURAL; INFILTRATION; PERINEURAL
Status: DISCONTINUED | OUTPATIENT
Start: 2021-10-08 | End: 2021-10-08 | Stop reason: HOSPADM

## 2021-10-08 RX ORDER — TALC
9 POWDER (GRAM) TOPICAL NIGHTLY PRN
Status: DISCONTINUED | OUTPATIENT
Start: 2021-10-08 | End: 2021-10-09 | Stop reason: HOSPADM

## 2021-10-08 RX ORDER — NAPROXEN SODIUM 220 MG/1
81 TABLET, FILM COATED ORAL 2 TIMES DAILY
Status: DISCONTINUED | OUTPATIENT
Start: 2021-10-08 | End: 2021-10-09 | Stop reason: HOSPADM

## 2021-10-08 RX ORDER — HYDROCODONE BITARTRATE AND ACETAMINOPHEN 5; 325 MG/1; MG/1
1 TABLET ORAL EVERY 4 HOURS PRN
Status: DISCONTINUED | OUTPATIENT
Start: 2021-10-08 | End: 2021-10-09 | Stop reason: HOSPADM

## 2021-10-08 RX ORDER — ONDANSETRON 2 MG/ML
INJECTION INTRAMUSCULAR; INTRAVENOUS
Status: DISCONTINUED | OUTPATIENT
Start: 2021-10-08 | End: 2021-10-08

## 2021-10-08 RX ORDER — LIDOCAINE HYDROCHLORIDE 10 MG/ML
0.5 INJECTION, SOLUTION EPIDURAL; INFILTRATION; INTRACAUDAL; PERINEURAL ONCE
Status: DISCONTINUED | OUTPATIENT
Start: 2021-10-08 | End: 2021-10-08 | Stop reason: HOSPADM

## 2021-10-08 RX ORDER — HYDROCODONE BITARTRATE AND ACETAMINOPHEN 10; 325 MG/1; MG/1
1 TABLET ORAL EVERY 6 HOURS PRN
Qty: 50 TABLET | Refills: 0 | Status: SHIPPED | OUTPATIENT
Start: 2021-10-08 | End: 2023-12-20

## 2021-10-08 RX ORDER — SODIUM CHLORIDE 9 MG/ML
INJECTION, SOLUTION INTRAVENOUS CONTINUOUS
Status: DISCONTINUED | OUTPATIENT
Start: 2021-10-08 | End: 2021-10-08

## 2021-10-08 RX ORDER — OXYCODONE HYDROCHLORIDE 5 MG/1
5 TABLET ORAL
Status: DISCONTINUED | OUTPATIENT
Start: 2021-10-08 | End: 2021-10-08 | Stop reason: HOSPADM

## 2021-10-08 RX ORDER — POLYETHYLENE GLYCOL 3350 17 G/17G
17 POWDER, FOR SOLUTION ORAL DAILY
Status: DISCONTINUED | OUTPATIENT
Start: 2021-10-08 | End: 2021-10-09 | Stop reason: HOSPADM

## 2021-10-08 RX ORDER — IBUPROFEN 600 MG/1
600 TABLET ORAL 3 TIMES DAILY
Status: DISCONTINUED | OUTPATIENT
Start: 2021-10-08 | End: 2021-10-09 | Stop reason: HOSPADM

## 2021-10-08 RX ORDER — PROPOFOL 10 MG/ML
VIAL (ML) INTRAVENOUS CONTINUOUS PRN
Status: DISCONTINUED | OUTPATIENT
Start: 2021-10-08 | End: 2021-10-08

## 2021-10-08 RX ORDER — CYCLOBENZAPRINE HCL 5 MG
5 TABLET ORAL 3 TIMES DAILY PRN
Status: DISCONTINUED | OUTPATIENT
Start: 2021-10-08 | End: 2021-10-09 | Stop reason: HOSPADM

## 2021-10-08 RX ORDER — ROPIVACAINE HYDROCHLORIDE 5 MG/ML
INJECTION, SOLUTION EPIDURAL; INFILTRATION; PERINEURAL
Status: COMPLETED | OUTPATIENT
Start: 2021-10-08 | End: 2021-10-08

## 2021-10-08 RX ORDER — SODIUM CHLORIDE 0.9 % (FLUSH) 0.9 %
3 SYRINGE (ML) INJECTION
Status: DISCONTINUED | OUTPATIENT
Start: 2021-10-08 | End: 2021-10-09 | Stop reason: HOSPADM

## 2021-10-08 RX ORDER — SODIUM CHLORIDE 0.9 % (FLUSH) 0.9 %
5 SYRINGE (ML) INJECTION
Status: DISCONTINUED | OUTPATIENT
Start: 2021-10-08 | End: 2021-10-09 | Stop reason: HOSPADM

## 2021-10-08 RX ORDER — DEXTROSE MONOHYDRATE AND SODIUM CHLORIDE 5; .9 G/100ML; G/100ML
INJECTION, SOLUTION INTRAVENOUS CONTINUOUS
Status: DISCONTINUED | OUTPATIENT
Start: 2021-10-08 | End: 2021-10-09 | Stop reason: HOSPADM

## 2021-10-08 RX ORDER — SODIUM CHLORIDE, SODIUM LACTATE, POTASSIUM CHLORIDE, CALCIUM CHLORIDE 600; 310; 30; 20 MG/100ML; MG/100ML; MG/100ML; MG/100ML
INJECTION, SOLUTION INTRAVENOUS CONTINUOUS
Status: DISCONTINUED | OUTPATIENT
Start: 2021-10-08 | End: 2021-10-08

## 2021-10-08 RX ORDER — PROCHLORPERAZINE EDISYLATE 5 MG/ML
5 INJECTION INTRAMUSCULAR; INTRAVENOUS EVERY 30 MIN PRN
Status: DISCONTINUED | OUTPATIENT
Start: 2021-10-08 | End: 2021-10-08 | Stop reason: HOSPADM

## 2021-10-08 RX ORDER — PROPOFOL 10 MG/ML
VIAL (ML) INTRAVENOUS
Status: DISCONTINUED | OUTPATIENT
Start: 2021-10-08 | End: 2021-10-08

## 2021-10-08 RX ORDER — HYDROCODONE BITARTRATE AND ACETAMINOPHEN 10; 325 MG/1; MG/1
1 TABLET ORAL EVERY 4 HOURS PRN
Status: DISCONTINUED | OUTPATIENT
Start: 2021-10-08 | End: 2021-10-09 | Stop reason: HOSPADM

## 2021-10-08 RX ORDER — EPHEDRINE SULFATE 50 MG/ML
INJECTION, SOLUTION INTRAVENOUS
Status: DISCONTINUED | OUTPATIENT
Start: 2021-10-08 | End: 2021-10-08

## 2021-10-08 RX ORDER — METOCLOPRAMIDE HYDROCHLORIDE 5 MG/ML
5 INJECTION INTRAMUSCULAR; INTRAVENOUS EVERY 6 HOURS PRN
Status: DISCONTINUED | OUTPATIENT
Start: 2021-10-08 | End: 2021-10-09 | Stop reason: HOSPADM

## 2021-10-08 RX ORDER — HYDROMORPHONE HYDROCHLORIDE 2 MG/ML
0.4 INJECTION, SOLUTION INTRAMUSCULAR; INTRAVENOUS; SUBCUTANEOUS EVERY 5 MIN PRN
Status: DISCONTINUED | OUTPATIENT
Start: 2021-10-08 | End: 2021-10-08 | Stop reason: HOSPADM

## 2021-10-08 RX ORDER — PHENYLEPHRINE HYDROCHLORIDE 10 MG/ML
INJECTION INTRAVENOUS
Status: DISCONTINUED | OUTPATIENT
Start: 2021-10-08 | End: 2021-10-08

## 2021-10-08 RX ORDER — MEPERIDINE HYDROCHLORIDE 25 MG/ML
12.5 INJECTION INTRAMUSCULAR; INTRAVENOUS; SUBCUTANEOUS ONCE AS NEEDED
Status: DISCONTINUED | OUTPATIENT
Start: 2021-10-08 | End: 2021-10-08 | Stop reason: HOSPADM

## 2021-10-08 RX ORDER — ACETAMINOPHEN 500 MG
1000 TABLET ORAL
Status: COMPLETED | OUTPATIENT
Start: 2021-10-08 | End: 2021-10-08

## 2021-10-08 RX ADMIN — SODIUM CHLORIDE, SODIUM LACTATE, POTASSIUM CHLORIDE, AND CALCIUM CHLORIDE: 600; 310; 30; 20 INJECTION, SOLUTION INTRAVENOUS at 07:10

## 2021-10-08 RX ADMIN — Medication 9 MG: at 09:10

## 2021-10-08 RX ADMIN — EPHEDRINE SULFATE 10 MG: 50 INJECTION INTRAVENOUS at 07:10

## 2021-10-08 RX ADMIN — SODIUM CHLORIDE, SODIUM LACTATE, POTASSIUM CHLORIDE, AND CALCIUM CHLORIDE: 600; 310; 30; 20 INJECTION, SOLUTION INTRAVENOUS at 06:10

## 2021-10-08 RX ADMIN — PROPOFOL 60 MG: 10 INJECTION, EMULSION INTRAVENOUS at 07:10

## 2021-10-08 RX ADMIN — MIDAZOLAM HYDROCHLORIDE 1 MG: 1 INJECTION, SOLUTION INTRAMUSCULAR; INTRAVENOUS at 06:10

## 2021-10-08 RX ADMIN — CEFAZOLIN SODIUM 2 G: 2 SOLUTION INTRAVENOUS at 03:10

## 2021-10-08 RX ADMIN — POLYETHYLENE GLYCOL 3350 17 G: 17 POWDER, FOR SOLUTION ORAL at 11:10

## 2021-10-08 RX ADMIN — CEFAZOLIN 2 G: 330 INJECTION, POWDER, FOR SOLUTION INTRAMUSCULAR; INTRAVENOUS at 07:10

## 2021-10-08 RX ADMIN — HYDROCODONE BITARTRATE AND ACETAMINOPHEN 1 TABLET: 10; 325 TABLET ORAL at 07:10

## 2021-10-08 RX ADMIN — PHENYLEPHRINE HYDROCHLORIDE 100 MCG: 10 INJECTION INTRAVENOUS at 08:10

## 2021-10-08 RX ADMIN — CYCLOBENZAPRINE HYDROCHLORIDE 5 MG: 5 TABLET, FILM COATED ORAL at 01:10

## 2021-10-08 RX ADMIN — ASPIRIN 81 MG CHEWABLE TABLET 81 MG: 81 TABLET CHEWABLE at 09:10

## 2021-10-08 RX ADMIN — IBUPROFEN 600 MG: 600 TABLET, FILM COATED ORAL at 09:10

## 2021-10-08 RX ADMIN — PROPOFOL 100 MG: 10 INJECTION, EMULSION INTRAVENOUS at 07:10

## 2021-10-08 RX ADMIN — EPHEDRINE SULFATE 10 MG: 50 INJECTION INTRAVENOUS at 08:10

## 2021-10-08 RX ADMIN — IBUPROFEN 600 MG: 600 TABLET, FILM COATED ORAL at 04:10

## 2021-10-08 RX ADMIN — OXYCODONE 5 MG: 5 TABLET ORAL at 09:10

## 2021-10-08 RX ADMIN — PROPOFOL 100 MCG/KG/MIN: 10 INJECTION, EMULSION INTRAVENOUS at 07:10

## 2021-10-08 RX ADMIN — VALACYCLOVIR HYDROCHLORIDE 500 MG: 500 TABLET, FILM COATED ORAL at 09:10

## 2021-10-08 RX ADMIN — HYDROCODONE BITARTRATE AND ACETAMINOPHEN 1 TABLET: 10; 325 TABLET ORAL at 03:10

## 2021-10-08 RX ADMIN — ONDANSETRON HYDROCHLORIDE 4 MG: 2 INJECTION INTRAMUSCULAR; INTRAVENOUS at 08:10

## 2021-10-08 RX ADMIN — ROPIVACAINE HYDROCHLORIDE 3 ML: 5 INJECTION, SOLUTION EPIDURAL; INFILTRATION; PERINEURAL at 06:10

## 2021-10-08 RX ADMIN — FENTANYL CITRATE 50 MCG: 50 INJECTION, SOLUTION INTRAMUSCULAR; INTRAVENOUS at 06:10

## 2021-10-08 RX ADMIN — FAMOTIDINE 20 MG: 20 TABLET ORAL at 12:10

## 2021-10-08 RX ADMIN — HYDROCODONE BITARTRATE AND ACETAMINOPHEN 1 TABLET: 10; 325 TABLET ORAL at 11:10

## 2021-10-08 RX ADMIN — DEXTROSE AND SODIUM CHLORIDE: 5; .9 INJECTION, SOLUTION INTRAVENOUS at 11:10

## 2021-10-08 RX ADMIN — PHENYLEPHRINE HYDROCHLORIDE 100 MCG: 10 INJECTION INTRAVENOUS at 07:10

## 2021-10-08 RX ADMIN — CEFAZOLIN SODIUM 2 G: 2 SOLUTION INTRAVENOUS at 11:10

## 2021-10-08 RX ADMIN — ROPIVACAINE HYDROCHLORIDE 15 ML: 5 INJECTION, SOLUTION EPIDURAL; INFILTRATION; PERINEURAL at 06:10

## 2021-10-08 RX ADMIN — LIDOCAINE HYDROCHLORIDE 40 MG: 20 INJECTION, SOLUTION INTRAVENOUS at 07:10

## 2021-10-08 RX ADMIN — ACETAMINOPHEN 1000 MG: 500 TABLET ORAL at 05:10

## 2021-10-08 RX ADMIN — ASPIRIN 81 MG CHEWABLE TABLET 81 MG: 81 TABLET CHEWABLE at 12:10

## 2021-10-09 VITALS
DIASTOLIC BLOOD PRESSURE: 55 MMHG | HEART RATE: 63 BPM | RESPIRATION RATE: 18 BRPM | WEIGHT: 140 LBS | HEIGHT: 64 IN | TEMPERATURE: 99 F | OXYGEN SATURATION: 96 % | SYSTOLIC BLOOD PRESSURE: 102 MMHG | BODY MASS INDEX: 23.9 KG/M2

## 2021-10-09 LAB
ANION GAP SERPL CALC-SCNC: 7 MMOL/L (ref 8–16)
BASOPHILS # BLD AUTO: 0.03 K/UL (ref 0–0.2)
BASOPHILS NFR BLD: 0.6 % (ref 0–1.9)
BUN SERPL-MCNC: 8 MG/DL (ref 8–23)
CALCIUM SERPL-MCNC: 8.4 MG/DL (ref 8.7–10.5)
CHLORIDE SERPL-SCNC: 108 MMOL/L (ref 95–110)
CO2 SERPL-SCNC: 27 MMOL/L (ref 23–29)
CREAT SERPL-MCNC: 0.7 MG/DL (ref 0.5–1.4)
DIFFERENTIAL METHOD: ABNORMAL
EOSINOPHIL # BLD AUTO: 0.1 K/UL (ref 0–0.5)
EOSINOPHIL NFR BLD: 1.3 % (ref 0–8)
ERYTHROCYTE [DISTWIDTH] IN BLOOD BY AUTOMATED COUNT: 13.2 % (ref 11.5–14.5)
EST. GFR  (AFRICAN AMERICAN): >60 ML/MIN/1.73 M^2
EST. GFR  (NON AFRICAN AMERICAN): >60 ML/MIN/1.73 M^2
GLUCOSE SERPL-MCNC: 112 MG/DL (ref 70–110)
HCT VFR BLD AUTO: 29 % (ref 37–48.5)
HGB BLD-MCNC: 9.4 G/DL (ref 12–16)
IMM GRANULOCYTES # BLD AUTO: 0.01 K/UL (ref 0–0.04)
IMM GRANULOCYTES NFR BLD AUTO: 0.2 % (ref 0–0.5)
LYMPHOCYTES # BLD AUTO: 1.7 K/UL (ref 1–4.8)
LYMPHOCYTES NFR BLD: 33 % (ref 18–48)
MCH RBC QN AUTO: 31.4 PG (ref 27–31)
MCHC RBC AUTO-ENTMCNC: 32.4 G/DL (ref 32–36)
MCV RBC AUTO: 97 FL (ref 82–98)
MONOCYTES # BLD AUTO: 0.8 K/UL (ref 0.3–1)
MONOCYTES NFR BLD: 14.2 % (ref 4–15)
NEUTROPHILS # BLD AUTO: 2.7 K/UL (ref 1.8–7.7)
NEUTROPHILS NFR BLD: 50.7 % (ref 38–73)
NRBC BLD-RTO: 0 /100 WBC
PLATELET # BLD AUTO: 170 K/UL (ref 150–450)
PMV BLD AUTO: 10.3 FL (ref 9.2–12.9)
POTASSIUM SERPL-SCNC: 4 MMOL/L (ref 3.5–5.1)
RBC # BLD AUTO: 2.99 M/UL (ref 4–5.4)
SODIUM SERPL-SCNC: 142 MMOL/L (ref 136–145)
WBC # BLD AUTO: 5.28 K/UL (ref 3.9–12.7)

## 2021-10-09 PROCEDURE — 85025 COMPLETE CBC W/AUTO DIFF WBC: CPT | Performed by: ORTHOPAEDIC SURGERY

## 2021-10-09 PROCEDURE — 97166 OT EVAL MOD COMPLEX 45 MIN: CPT

## 2021-10-09 PROCEDURE — 25000003 PHARM REV CODE 250: Performed by: ORTHOPAEDIC SURGERY

## 2021-10-09 PROCEDURE — 97535 SELF CARE MNGMENT TRAINING: CPT

## 2021-10-09 PROCEDURE — 63600175 PHARM REV CODE 636 W HCPCS: Performed by: ORTHOPAEDIC SURGERY

## 2021-10-09 PROCEDURE — 80048 BASIC METABOLIC PNL TOTAL CA: CPT | Performed by: ORTHOPAEDIC SURGERY

## 2021-10-09 PROCEDURE — 97530 THERAPEUTIC ACTIVITIES: CPT | Mod: CQ

## 2021-10-09 PROCEDURE — 97116 GAIT TRAINING THERAPY: CPT | Mod: CQ

## 2021-10-09 PROCEDURE — 36415 COLL VENOUS BLD VENIPUNCTURE: CPT | Performed by: ORTHOPAEDIC SURGERY

## 2021-10-09 RX ADMIN — HYDROCODONE BITARTRATE AND ACETAMINOPHEN 1 TABLET: 5; 325 TABLET ORAL at 12:10

## 2021-10-09 RX ADMIN — CEFAZOLIN SODIUM 2 G: 2 SOLUTION INTRAVENOUS at 06:10

## 2021-10-09 RX ADMIN — IBUPROFEN 600 MG: 600 TABLET, FILM COATED ORAL at 08:10

## 2021-10-09 RX ADMIN — ASPIRIN 81 MG CHEWABLE TABLET 81 MG: 81 TABLET CHEWABLE at 08:10

## 2021-10-09 RX ADMIN — VALACYCLOVIR HYDROCHLORIDE 500 MG: 500 TABLET, FILM COATED ORAL at 08:10

## 2021-10-09 RX ADMIN — HYDROCODONE BITARTRATE AND ACETAMINOPHEN 1 TABLET: 10; 325 TABLET ORAL at 07:10

## 2021-10-09 RX ADMIN — POLYETHYLENE GLYCOL 3350 17 G: 17 POWDER, FOR SOLUTION ORAL at 08:10

## 2021-10-09 RX ADMIN — FAMOTIDINE 20 MG: 20 TABLET ORAL at 08:10

## 2021-10-15 LAB
FINAL PATHOLOGIC DIAGNOSIS: NORMAL
Lab: NORMAL

## 2022-01-24 ENCOUNTER — DOCUMENTATION ONLY (OUTPATIENT)
Dept: ORTHOPEDICS | Facility: OTHER | Age: 62
End: 2022-01-24
Payer: COMMERCIAL

## 2022-01-24 NOTE — PROGRESS NOTES
This is an addendum related to the operative procedure 10/8/21.  The patient's degenerative hip was caused by overzealous exercising around July.  She was seen by one of my partners.  That injury caused the hip to progress to arthritic condition requiring hip replacement surgery.  So the initial diagnosis code M 16.11  Should be changed Paul 6.514 to MAC osteoarthritis.

## 2023-02-24 ENCOUNTER — OFFICE VISIT (OUTPATIENT)
Dept: URGENT CARE | Facility: CLINIC | Age: 63
End: 2023-02-24
Payer: COMMERCIAL

## 2023-02-24 VITALS
BODY MASS INDEX: 25.88 KG/M2 | OXYGEN SATURATION: 97 % | TEMPERATURE: 98 F | RESPIRATION RATE: 18 BRPM | SYSTOLIC BLOOD PRESSURE: 118 MMHG | DIASTOLIC BLOOD PRESSURE: 73 MMHG | WEIGHT: 150.81 LBS | HEART RATE: 59 BPM

## 2023-02-24 DIAGNOSIS — R51.9 ACUTE NONINTRACTABLE HEADACHE, UNSPECIFIED HEADACHE TYPE: Primary | ICD-10-CM

## 2023-02-24 LAB
CTP QC/QA: YES
SARS-COV-2 RDRP RESP QL NAA+PROBE: NEGATIVE

## 2023-02-24 PROCEDURE — 3008F BODY MASS INDEX DOCD: CPT | Mod: CPTII,S$GLB,, | Performed by: NURSE PRACTITIONER

## 2023-02-24 PROCEDURE — 3074F PR MOST RECENT SYSTOLIC BLOOD PRESSURE < 130 MM HG: ICD-10-PCS | Mod: CPTII,S$GLB,, | Performed by: NURSE PRACTITIONER

## 2023-02-24 PROCEDURE — 1159F MED LIST DOCD IN RCRD: CPT | Mod: CPTII,S$GLB,, | Performed by: NURSE PRACTITIONER

## 2023-02-24 PROCEDURE — 3078F PR MOST RECENT DIASTOLIC BLOOD PRESSURE < 80 MM HG: ICD-10-PCS | Mod: CPTII,S$GLB,, | Performed by: NURSE PRACTITIONER

## 2023-02-24 PROCEDURE — 1159F PR MEDICATION LIST DOCUMENTED IN MEDICAL RECORD: ICD-10-PCS | Mod: CPTII,S$GLB,, | Performed by: NURSE PRACTITIONER

## 2023-02-24 PROCEDURE — 87635: ICD-10-PCS | Mod: QW,S$GLB,, | Performed by: NURSE PRACTITIONER

## 2023-02-24 PROCEDURE — 87635 SARS-COV-2 COVID-19 AMP PRB: CPT | Mod: QW,S$GLB,, | Performed by: NURSE PRACTITIONER

## 2023-02-24 PROCEDURE — 99213 PR OFFICE/OUTPT VISIT, EST, LEVL III, 20-29 MIN: ICD-10-PCS | Mod: S$GLB,,, | Performed by: NURSE PRACTITIONER

## 2023-02-24 PROCEDURE — 3008F PR BODY MASS INDEX (BMI) DOCUMENTED: ICD-10-PCS | Mod: CPTII,S$GLB,, | Performed by: NURSE PRACTITIONER

## 2023-02-24 PROCEDURE — 3078F DIAST BP <80 MM HG: CPT | Mod: CPTII,S$GLB,, | Performed by: NURSE PRACTITIONER

## 2023-02-24 PROCEDURE — 99213 OFFICE O/P EST LOW 20 MIN: CPT | Mod: S$GLB,,, | Performed by: NURSE PRACTITIONER

## 2023-02-24 PROCEDURE — 3074F SYST BP LT 130 MM HG: CPT | Mod: CPTII,S$GLB,, | Performed by: NURSE PRACTITIONER

## 2023-02-24 NOTE — PROGRESS NOTES
Subjective:       Patient ID: Mago Cleaning is a 62 y.o. female.    Vitals:  weight is 68.4 kg (150 lb 12.8 oz). Her oral temperature is 98 °F (36.7 °C). Her blood pressure is 118/73 and her pulse is 59 (abnormal). Her respiration is 18 and oxygen saturation is 97%.     Chief Complaint: Headache    Headache   The current episode started yesterday. The problem occurs intermittently. The problem has been unchanged. The quality of the pain is described as aching. The pain is at a severity of 2/10. The pain is mild. Nothing aggravates the symptoms. She has tried nothing for the symptoms.   Patient's  tested positive for COVID today and patient would like to be tested.  She is complaining of only a headache.    Neurological:  Positive for headaches.     Objective:      Physical Exam   Constitutional: She is oriented to person, place, and time. She appears well-developed. She is cooperative.   HENT:   Head: Normocephalic and atraumatic.   Ears:   Right Ear: Hearing and external ear normal.   Left Ear: Hearing and external ear normal.   Nose: Nose normal. No mucosal edema or nasal deformity. No epistaxis. Right sinus exhibits no maxillary sinus tenderness and no frontal sinus tenderness. Left sinus exhibits no maxillary sinus tenderness and no frontal sinus tenderness.   Mouth/Throat: Uvula is midline, oropharynx is clear and moist and mucous membranes are normal. No trismus in the jaw. Normal dentition. No uvula swelling.   Eyes: Conjunctivae and lids are normal.   Neck: Trachea normal and phonation normal. Neck supple.   Cardiovascular: Normal rate and normal pulses.   Pulmonary/Chest: Effort normal.   Abdominal: Normal appearance.   Musculoskeletal: Normal range of motion.         General: Normal range of motion.   Neurological: She is alert and oriented to person, place, and time. She exhibits normal muscle tone.   Skin: Skin is warm, dry and intact.   Psychiatric: Her speech is normal and behavior is  normal. Judgment and thought content normal.   Nursing note and vitals reviewed.      Assessment:       1. Cough, unspecified type            Plan:         Cough, unspecified type  -     POCT COVID-19 Rapid Screening

## 2023-02-24 NOTE — PATIENT INSTRUCTIONS
You have been exposed to COVID and tested negative today. Return if symptoms develop    - Rest.    - Drink plenty of fluids.    - Acetaminophen (tylenol) or Ibuprofen (advil,motrin) as directed as needed for fever/pain. Avoid tylenol if you have a history of liver disease. Do not take ibuprofen if you have a history of GI bleeding, kidney disease, or if you take blood thinners.   - Ibuprofen dosing for adults: 400 mg by mouth every 4-6 hours as needed. Max: 2400 mg/day; Info: use lowest effective dose, shortest effective treatment duration; give w/ food if GI upset occurs.    - You must understand that you have received an Urgent Care treatment only and that you may be released before all of your medical problems are known or treated.   - You, the patient, will arrange for follow up care as instructed.   - If your condition worsens or fails to improve we recommend that you receive another evaluation at the ER immediately or contact your PCP to discuss your concerns or return here.   - Follow up with your PCP or specialty clinic as directed in the next 1-2 weeks if not improved or as needed.  You can call [phone] to schedule an appointment with the appropriate provider.        If your symptoms do not improve or worsen, go to the emergency room immediately.

## 2023-05-08 ENCOUNTER — TELEPHONE (OUTPATIENT)
Dept: OBSTETRICS AND GYNECOLOGY | Facility: CLINIC | Age: 63
End: 2023-05-08
Payer: COMMERCIAL

## 2023-05-08 NOTE — TELEPHONE ENCOUNTER
----- Message from Alvina Weathers sent at 5/8/2023  3:38 PM CDT -----  Regarding: appointment  Name of Who is Calling: Mago           What is the request in detail: Patient is requesting a call back to schedule an appointment.           Can the clinic reply by [facility]: No           [facility] [phone]

## 2023-06-05 ENCOUNTER — OFFICE VISIT (OUTPATIENT)
Dept: OBSTETRICS AND GYNECOLOGY | Facility: CLINIC | Age: 63
End: 2023-06-05
Payer: COMMERCIAL

## 2023-06-05 ENCOUNTER — LAB VISIT (OUTPATIENT)
Dept: LAB | Facility: OTHER | Age: 63
End: 2023-06-05
Attending: NURSE PRACTITIONER
Payer: COMMERCIAL

## 2023-06-05 VITALS
HEART RATE: 62 BPM | BODY MASS INDEX: 25.61 KG/M2 | HEIGHT: 64 IN | DIASTOLIC BLOOD PRESSURE: 80 MMHG | SYSTOLIC BLOOD PRESSURE: 129 MMHG | WEIGHT: 150 LBS

## 2023-06-05 DIAGNOSIS — R68.82 LOW LIBIDO: Primary | ICD-10-CM

## 2023-06-05 DIAGNOSIS — R68.82 LOW LIBIDO: ICD-10-CM

## 2023-06-05 LAB
DHEA-S SERPL-MCNC: 245.2 UG/DL (ref 29.7–182.2)
TESTOST SERPL-MCNC: 39 NG/DL (ref 5–73)

## 2023-06-05 PROCEDURE — 1160F RVW MEDS BY RX/DR IN RCRD: CPT | Mod: CPTII,S$GLB,, | Performed by: NURSE PRACTITIONER

## 2023-06-05 PROCEDURE — 82670 ASSAY OF TOTAL ESTRADIOL: CPT | Performed by: NURSE PRACTITIONER

## 2023-06-05 PROCEDURE — 3074F SYST BP LT 130 MM HG: CPT | Mod: CPTII,S$GLB,, | Performed by: NURSE PRACTITIONER

## 2023-06-05 PROCEDURE — 1160F PR REVIEW ALL MEDS BY PRESCRIBER/CLIN PHARMACIST DOCUMENTED: ICD-10-PCS | Mod: CPTII,S$GLB,, | Performed by: NURSE PRACTITIONER

## 2023-06-05 PROCEDURE — 99204 PR OFFICE/OUTPT VISIT, NEW, LEVL IV, 45-59 MIN: ICD-10-PCS | Mod: S$GLB,,, | Performed by: NURSE PRACTITIONER

## 2023-06-05 PROCEDURE — 99999 PR PBB SHADOW E&M-EST. PATIENT-LVL IV: ICD-10-PCS | Mod: PBBFAC,,, | Performed by: NURSE PRACTITIONER

## 2023-06-05 PROCEDURE — 84402 ASSAY OF FREE TESTOSTERONE: CPT | Performed by: NURSE PRACTITIONER

## 2023-06-05 PROCEDURE — 3008F BODY MASS INDEX DOCD: CPT | Mod: CPTII,S$GLB,, | Performed by: NURSE PRACTITIONER

## 2023-06-05 PROCEDURE — 1159F MED LIST DOCD IN RCRD: CPT | Mod: CPTII,S$GLB,, | Performed by: NURSE PRACTITIONER

## 2023-06-05 PROCEDURE — 99999 PR PBB SHADOW E&M-EST. PATIENT-LVL IV: CPT | Mod: PBBFAC,,, | Performed by: NURSE PRACTITIONER

## 2023-06-05 PROCEDURE — 3074F PR MOST RECENT SYSTOLIC BLOOD PRESSURE < 130 MM HG: ICD-10-PCS | Mod: CPTII,S$GLB,, | Performed by: NURSE PRACTITIONER

## 2023-06-05 PROCEDURE — 1159F PR MEDICATION LIST DOCUMENTED IN MEDICAL RECORD: ICD-10-PCS | Mod: CPTII,S$GLB,, | Performed by: NURSE PRACTITIONER

## 2023-06-05 PROCEDURE — 82627 DEHYDROEPIANDROSTERONE: CPT | Performed by: NURSE PRACTITIONER

## 2023-06-05 PROCEDURE — 3008F PR BODY MASS INDEX (BMI) DOCUMENTED: ICD-10-PCS | Mod: CPTII,S$GLB,, | Performed by: NURSE PRACTITIONER

## 2023-06-05 PROCEDURE — 84403 ASSAY OF TOTAL TESTOSTERONE: CPT | Performed by: NURSE PRACTITIONER

## 2023-06-05 PROCEDURE — 99204 OFFICE O/P NEW MOD 45 MIN: CPT | Mod: S$GLB,,, | Performed by: NURSE PRACTITIONER

## 2023-06-05 PROCEDURE — 3079F DIAST BP 80-89 MM HG: CPT | Mod: CPTII,S$GLB,, | Performed by: NURSE PRACTITIONER

## 2023-06-05 PROCEDURE — 3079F PR MOST RECENT DIASTOLIC BLOOD PRESSURE 80-89 MM HG: ICD-10-PCS | Mod: CPTII,S$GLB,, | Performed by: NURSE PRACTITIONER

## 2023-06-05 PROCEDURE — 36415 COLL VENOUS BLD VENIPUNCTURE: CPT | Performed by: NURSE PRACTITIONER

## 2023-06-05 RX ORDER — LEVOTHYROXINE, LIOTHYRONINE 9.5; 2.25 UG/1; UG/1
15 TABLET ORAL EVERY MORNING
COMMUNITY
Start: 2023-05-10 | End: 2023-12-12 | Stop reason: SDUPTHER

## 2023-06-05 RX ORDER — DOXYCYCLINE HYCLATE 100 MG
TABLET ORAL
COMMUNITY
Start: 2023-05-24 | End: 2023-12-20

## 2023-06-05 RX ORDER — SPIRONOLACTONE 25 MG/1
25 TABLET ORAL
COMMUNITY
Start: 2023-05-11

## 2023-06-05 NOTE — PROGRESS NOTES
Subjective:      Mago Cleaning is a 62 y.o. female who presents to discuss hormone replacement therapy. She is SP surgical hysterectomy, use of oral Estrace for 10+ years. Patient is requesting hormone replacement therapy due to low libido.The patient is taking hormone replacement therapy. Patient denies post-menopausal vaginal bleeding. The patient is sexually active.  She denies the following contraindications to HRT:  Vaginal bleeding, history of VTE/PE, thrombophilia,  breast cancer, or active liver disease.       Currently taking Estradiol 1 mg PO QD, 60 mg/g Testosterone CMPD 2 clickls, 3-4 times weekly. Additionally taking DHEA and 15 mg NP Thyroid.    Reported significant improvement to libido with initial use of Testosterone cream, then switched to fast dissolve tablet with lowered effect. No improvement to libido with current Testosterone dosage is reported. Seeking improvement. Concern for elevated DHEA with current dosage.       No visits with results within 3 Month(s) from this visit.   Latest known visit with results is:   Office Visit on 02/24/2023   Component Date Value Ref Range Status    POC Rapid COVID 02/24/2023 Negative  Negative Final     Acceptable 02/24/2023 Yes   Final       Past Medical History:   Diagnosis Date    Arthritis      Past Surgical History:   Procedure Laterality Date    APPENDECTOMY      ARTHROPLASTY OF HIP BY ANTERIOR APPROACH Right 10/8/2021    Procedure: ARTHROPLASTY, HIP, TOTAL, ANTERIOR APPROACH /  RIGHT DIRECT ANTERIOR;  Surgeon: Shemar Ohara MD;  Location: [facility];  Service: Orthopedics;  Laterality: Right;  FROZEN SECTION EMAIL SENT 10-7 @ 12:24    HYSTERECTOMY       Social History     Tobacco Use    Smoking status: Former     Types: Cigarettes     Quit date: 6/5/2013     Years since quitting: 10.0    Smokeless tobacco: Never   Substance Use Topics    Alcohol use: Yes     Alcohol/week: 1.0 standard drink     Types: 1 Glasses of wine per  week     Comment: Daily    Drug use: No     Family History   Problem Relation Age of Onset    Stroke Mother     Dementia Mother     Stroke Father     Diabetes Father      OB History   No obstetric history on file.       Current Outpatient Medications:     b complex vitamins capsule, Take 1 capsule by mouth once daily., Disp: , Rfl:     bisacodyL (DULCOLAX) 5 mg EC tablet, Take 5 mg by mouth daily as needed for Constipation. prn, Disp: , Rfl:     chromium picolinate 200 mcg Cap, Take by mouth., Disp: , Rfl:     co-enzyme Q-10 30 mg capsule, Take 100 mg by mouth 3 (three) times daily., Disp: , Rfl:     doxycycline (VIBRA-TABS) 100 MG tablet, Take by mouth., Disp: , Rfl:     EScitalopram oxalate (LEXAPRO) 10 MG tablet, Take 10 mg by mouth once daily., Disp: , Rfl:     estradiol (ESTRACE) 1 MG tablet, , Disp: , Rfl:     glutamine 500 mg Cap, Take by mouth., Disp: , Rfl:     magnesium oxide,aspartate,citr (TRIPLE MAGNESIUM COMPLEX ORAL), Take by mouth. 400mg, Disp: , Rfl:     meloxicam (MOBIC) 15 MG tablet, Take 15 mg by mouth once daily., Disp: , Rfl:     multivitamin capsule, Take 1 capsule by mouth once daily., Disp: , Rfl:     NP THYROID 15 mg Tab, Take 15 mg by mouth every morning., Disp: , Rfl:     selenium 50 mcg Tab, Take 200 mcg by mouth once daily., Disp: , Rfl:     spironolactone (ALDACTONE) 25 MG tablet, Take 25 mg by mouth., Disp: , Rfl:     UNABLE TO FIND, alphalipoic acid  600mg, Disp: , Rfl:     UNABLE TO FIND, Psyllium husks 610mg, Disp: , Rfl:     UNABLE TO FIND, [employer], Disp: , Rfl:     valACYclovir (VALTREX) 1000 MG tablet, , Disp: , Rfl:     vitamin D (VITAMIN D3) 1000 units Tab, Take 1,000 Units by mouth once daily. Takes 58867WA NIGHTLY, Disp: , Rfl:     zinc gluconate 50 mg tablet, Take 50 mg by mouth once daily., Disp: , Rfl:     diclofenac sodium (VOLTAREN) 1 % Gel, Apply 2 g topically 3 (three) times daily., Disp: 1 Tube, Rfl: 3    HYDROcodone-acetaminophen (NORCO)  mg per  "tablet, Take 1 tablet by mouth every 6 (six) hours as needed for Pain. (Patient not taking: Reported on 6/5/2023), Disp: 50 tablet, Rfl: 0    Vitals:    06/05/23 1319   BP: 129/80   Pulse: 62   Weight: 68 kg (150 lb)   Height: 5' 4" (1.626 m)   PainSc: 0-No pain     Body mass index is 25.75 kg/m².    Assessment:    Low libido  -     Testosterone, free; Future; Expected date: 06/05/2023  -     TESTOSTERONE; Future; Expected date: 06/05/2023  -     ESTRADIOL; Future; Expected date: 06/05/2023  -     DHEA-SULFATE; Future; Expected date: 06/05/2023        Plan:   Risks and benefits of hormone replacement therapy were discussed.  Hormone replacement therapy options, including bioidentical versus non-bioidentical hormones, as well as alternatives discussed.    HRT lab work today. Will FU in 1-2 weeks for recommendations.     Instructed patient to call if she experiences any side effects or has any questions.       KAUSHIK Akers-C      "

## 2023-06-06 LAB — ESTRADIOL SERPL-MCNC: 38 PG/ML

## 2023-06-08 LAB — TESTOST FREE SERPL-MCNC: 0.5 PG/ML

## 2023-06-13 ENCOUNTER — PATIENT MESSAGE (OUTPATIENT)
Dept: OBSTETRICS AND GYNECOLOGY | Facility: CLINIC | Age: 63
End: 2023-06-13

## 2023-06-13 DIAGNOSIS — R68.82 LOW LIBIDO: Primary | ICD-10-CM

## 2023-06-13 DIAGNOSIS — N95.1 MENOPAUSAL SYMPTOMS: ICD-10-CM

## 2023-06-13 RX ORDER — TESTOSTERONE CYPIONATE 200 MG/ML
50 INJECTION, SOLUTION INTRAMUSCULAR
Status: SHIPPED | OUTPATIENT
Start: 2023-06-13 | End: 2023-10-03

## 2023-06-13 RX ORDER — ESTRADIOL 1 MG/1
1.5 TABLET ORAL DAILY
Qty: 45 TABLET | Refills: 11 | Status: SHIPPED | OUTPATIENT
Start: 2023-06-13 | End: 2024-06-12

## 2023-07-17 ENCOUNTER — OFFICE VISIT (OUTPATIENT)
Dept: OBSTETRICS AND GYNECOLOGY | Facility: CLINIC | Age: 63
End: 2023-07-17
Payer: COMMERCIAL

## 2023-07-17 VITALS — BODY MASS INDEX: 26 KG/M2 | DIASTOLIC BLOOD PRESSURE: 82 MMHG | WEIGHT: 151.44 LBS | SYSTOLIC BLOOD PRESSURE: 128 MMHG

## 2023-07-17 DIAGNOSIS — E34.9 HORMONE IMBALANCE: Primary | ICD-10-CM

## 2023-07-17 PROCEDURE — 3074F PR MOST RECENT SYSTOLIC BLOOD PRESSURE < 130 MM HG: ICD-10-PCS | Mod: CPTII,S$GLB,, | Performed by: OBSTETRICS & GYNECOLOGY

## 2023-07-17 PROCEDURE — 3079F PR MOST RECENT DIASTOLIC BLOOD PRESSURE 80-89 MM HG: ICD-10-PCS | Mod: CPTII,S$GLB,, | Performed by: OBSTETRICS & GYNECOLOGY

## 2023-07-17 PROCEDURE — 3008F PR BODY MASS INDEX (BMI) DOCUMENTED: ICD-10-PCS | Mod: CPTII,S$GLB,, | Performed by: OBSTETRICS & GYNECOLOGY

## 2023-07-17 PROCEDURE — 3008F BODY MASS INDEX DOCD: CPT | Mod: CPTII,S$GLB,, | Performed by: OBSTETRICS & GYNECOLOGY

## 2023-07-17 PROCEDURE — 99213 OFFICE O/P EST LOW 20 MIN: CPT | Mod: S$GLB,,, | Performed by: OBSTETRICS & GYNECOLOGY

## 2023-07-17 PROCEDURE — 3079F DIAST BP 80-89 MM HG: CPT | Mod: CPTII,S$GLB,, | Performed by: OBSTETRICS & GYNECOLOGY

## 2023-07-17 PROCEDURE — 1159F MED LIST DOCD IN RCRD: CPT | Mod: CPTII,S$GLB,, | Performed by: OBSTETRICS & GYNECOLOGY

## 2023-07-17 PROCEDURE — 99213 PR OFFICE/OUTPT VISIT, EST, LEVL III, 20-29 MIN: ICD-10-PCS | Mod: S$GLB,,, | Performed by: OBSTETRICS & GYNECOLOGY

## 2023-07-17 PROCEDURE — 99999 PR PBB SHADOW E&M-EST. PATIENT-LVL IV: ICD-10-PCS | Mod: PBBFAC,,, | Performed by: OBSTETRICS & GYNECOLOGY

## 2023-07-17 PROCEDURE — 3074F SYST BP LT 130 MM HG: CPT | Mod: CPTII,S$GLB,, | Performed by: OBSTETRICS & GYNECOLOGY

## 2023-07-17 PROCEDURE — 99999 PR PBB SHADOW E&M-EST. PATIENT-LVL IV: CPT | Mod: PBBFAC,,, | Performed by: OBSTETRICS & GYNECOLOGY

## 2023-07-17 PROCEDURE — 1159F PR MEDICATION LIST DOCUMENTED IN MEDICAL RECORD: ICD-10-PCS | Mod: CPTII,S$GLB,, | Performed by: OBSTETRICS & GYNECOLOGY

## 2023-07-17 RX ORDER — ACYCLOVIR 400 MG/1
TABLET ORAL
COMMUNITY
Start: 2022-11-17

## 2023-07-17 RX ORDER — NEOMYCIN SULFATE, POLYMYXIN B SULFATE, AND DEXAMETHASONE 3.5; 10000; 1 MG/G; [USP'U]/G; MG/G
OINTMENT OPHTHALMIC
COMMUNITY
Start: 2023-05-09

## 2023-07-17 NOTE — PROGRESS NOTES
62 y.o.   OB History          0    Para   0    Term   0       0    AB   0    Living   0         SAB   0    IAB   0    Ectopic   0    Multiple   0    Live Births   0               Comlaining of: hormone issues  Had complete hyst, ovaries out  On hrt, current regiment   -estradiol 1.5mg  -test cream vaginally, 0.25/dose,   - dheas  -also oral      ROS:  GENERAL: No fever, chills, fatigability or weight loss.  SKIN: No rashes, itching or changes in color or texture of skin.  HEAD: No headaches or recent head trauma.  EYES: Visual acuity fine. No photophobia, ocular pain or diplopia.  EARS: Denies ear pain, discharge or vertigo.  NOSE: No loss of smell, no epistaxis or postnasal drip.  MOUTH & THROAT: No hoarseness or change in voice. No excessive gum bleeding.  NODES: Denies swollen glands.  CHEST: Denies TERRELL, cyanosis, wheezing, cough and sputum production.  CARDIOVASCULAR: Denies chest pain, PND, orthopnea or reduced exercise tolerance.  ABDOMEN: Appetite fine. No weight loss. Denies diarrhea, abdominal pain, hematemesis or blood in stool.  URINARY: No flank pain, dysuria or hematuria.  PERIPHERAL VASCULAR: No claudication or cyanosis.  MUSCULOSKELETAL: No joint stiffness or swelling. Denies back pain.  NEUROLOGIC: No history of seizures, paralysis, alteration of gait or coordination      PE: /82   Wt 68.7 kg (151 lb 7.3 oz)   BMI 26.00 kg/m²    Exam def    Pt can feel when adeq testosterone in system  Recent discomfort/irritation with the vag application  Oral estradiol seems ok  Was having increase hsv issues, better now that stopped progesterone  (Or decreased it)  Discussed issues  Disc poss estratest trial,would rather not use the type estrogen in that formula    A/P  Comtemplate next step,   Change hrt, ??  Will call pt  No levels at this time   I spent a total of 25 minutes on the day of the visit.This includes face to face time and non-face to face time preparing to see the patient (eg,  review of tests), obtaining and/or reviewing separately obtained history, documenting clinical information in the electronic or other health record, independently interpreting results and communicating results to the patient/family/caregiver, or care coordinator

## 2023-07-25 ENCOUNTER — TELEPHONE (OUTPATIENT)
Dept: OBSTETRICS AND GYNECOLOGY | Facility: CLINIC | Age: 63
End: 2023-07-25
Payer: COMMERCIAL

## 2023-07-25 NOTE — TELEPHONE ENCOUNTER
Discussed hrt   Poss estratest ? Hs?  Will cont current compounding  Update me if need or when time for annual  :)

## 2023-12-01 ENCOUNTER — TELEPHONE (OUTPATIENT)
Dept: OTOLARYNGOLOGY | Facility: CLINIC | Age: 63
End: 2023-12-01
Payer: MEDICARE

## 2023-12-05 ENCOUNTER — TELEPHONE (OUTPATIENT)
Dept: OBSTETRICS AND GYNECOLOGY | Facility: CLINIC | Age: 63
End: 2023-12-05
Payer: MEDICARE

## 2023-12-05 DIAGNOSIS — Z01.89 PATIENT REQUEST FOR DIAGNOSTIC TESTING: Primary | ICD-10-CM

## 2023-12-05 DIAGNOSIS — F41.9 ANXIETY DISORDER, UNSPECIFIED: ICD-10-CM

## 2023-12-05 NOTE — TELEPHONE ENCOUNTER
----- Message from Carlos Enrique Beverly sent at 12/5/2023 12:46 PM CST -----  Contact: Pt   .Type:  Needs Medical Advice    Who Called: pt  Would the patient rather a call back or a response via [facility]?  Call back  Best Call Back Number: [phone]  Additional Information: Pt. Is requesting her annual lab orders put in the system.

## 2023-12-05 NOTE — TELEPHONE ENCOUNTER
----- Message from Carlos Enrique Beverly sent at 12/5/2023 12:44 PM CST -----  Contact: Pt  .Type:  Mammogram    Caller is requesting to schedule their annual mammogram appointment.  Order is not listed in EPIC.  Please enter order and contact patient to schedule.  Name of Caller:pt  Where would they like the mammogram performed? [facility]  Would the patient rather a call back or a response via [facility]?  Call back  Best Call Back Number:[phone]  Additional Information: [facility] [address]

## 2023-12-12 ENCOUNTER — TELEPHONE (OUTPATIENT)
Dept: OBSTETRICS AND GYNECOLOGY | Facility: CLINIC | Age: 63
End: 2023-12-12

## 2023-12-12 ENCOUNTER — PATIENT MESSAGE (OUTPATIENT)
Dept: OBSTETRICS AND GYNECOLOGY | Facility: CLINIC | Age: 63
End: 2023-12-12

## 2023-12-12 ENCOUNTER — OFFICE VISIT (OUTPATIENT)
Dept: OBSTETRICS AND GYNECOLOGY | Facility: CLINIC | Age: 63
End: 2023-12-12
Payer: MEDICARE

## 2023-12-12 VITALS
SYSTOLIC BLOOD PRESSURE: 124 MMHG | DIASTOLIC BLOOD PRESSURE: 75 MMHG | BODY MASS INDEX: 26.04 KG/M2 | WEIGHT: 151.69 LBS

## 2023-12-12 DIAGNOSIS — Z12.39 ENCOUNTER FOR SCREENING FOR MALIGNANT NEOPLASM OF BREAST, UNSPECIFIED SCREENING MODALITY: Primary | ICD-10-CM

## 2023-12-12 DIAGNOSIS — Z13.6 ENCOUNTER FOR SCREENING FOR CARDIOVASCULAR DISORDERS: Primary | ICD-10-CM

## 2023-12-12 DIAGNOSIS — E03.9 HYPOTHYROIDISM, UNSPECIFIED TYPE: Primary | ICD-10-CM

## 2023-12-12 DIAGNOSIS — E03.9 HYPOTHYROIDISM, UNSPECIFIED TYPE: ICD-10-CM

## 2023-12-12 PROCEDURE — 1160F RVW MEDS BY RX/DR IN RCRD: CPT | Mod: CPTII,S$GLB,, | Performed by: OBSTETRICS & GYNECOLOGY

## 2023-12-12 PROCEDURE — 99999 PR PBB SHADOW E&M-EST. PATIENT-LVL III: ICD-10-PCS | Mod: PBBFAC,,, | Performed by: OBSTETRICS & GYNECOLOGY

## 2023-12-12 PROCEDURE — 88175 CYTOPATH C/V AUTO FLUID REDO: CPT | Performed by: OBSTETRICS & GYNECOLOGY

## 2023-12-12 PROCEDURE — 3078F DIAST BP <80 MM HG: CPT | Mod: CPTII,S$GLB,, | Performed by: OBSTETRICS & GYNECOLOGY

## 2023-12-12 PROCEDURE — 99999 PR PBB SHADOW E&M-EST. PATIENT-LVL III: CPT | Mod: PBBFAC,,, | Performed by: OBSTETRICS & GYNECOLOGY

## 2023-12-12 PROCEDURE — G0101 CA SCREEN;PELVIC/BREAST EXAM: HCPCS | Mod: GZ,S$GLB,, | Performed by: OBSTETRICS & GYNECOLOGY

## 2023-12-12 PROCEDURE — G0101 PR CA SCREEN;PELVIC/BREAST EXAM: ICD-10-PCS | Mod: GZ,S$GLB,, | Performed by: OBSTETRICS & GYNECOLOGY

## 2023-12-12 PROCEDURE — 1160F PR REVIEW ALL MEDS BY PRESCRIBER/CLIN PHARMACIST DOCUMENTED: ICD-10-PCS | Mod: CPTII,S$GLB,, | Performed by: OBSTETRICS & GYNECOLOGY

## 2023-12-12 PROCEDURE — 1159F PR MEDICATION LIST DOCUMENTED IN MEDICAL RECORD: ICD-10-PCS | Mod: CPTII,S$GLB,, | Performed by: OBSTETRICS & GYNECOLOGY

## 2023-12-12 PROCEDURE — 3074F PR MOST RECENT SYSTOLIC BLOOD PRESSURE < 130 MM HG: ICD-10-PCS | Mod: CPTII,S$GLB,, | Performed by: OBSTETRICS & GYNECOLOGY

## 2023-12-12 PROCEDURE — 3078F PR MOST RECENT DIASTOLIC BLOOD PRESSURE < 80 MM HG: ICD-10-PCS | Mod: CPTII,S$GLB,, | Performed by: OBSTETRICS & GYNECOLOGY

## 2023-12-12 PROCEDURE — 3074F SYST BP LT 130 MM HG: CPT | Mod: CPTII,S$GLB,, | Performed by: OBSTETRICS & GYNECOLOGY

## 2023-12-12 PROCEDURE — 1159F MED LIST DOCD IN RCRD: CPT | Mod: CPTII,S$GLB,, | Performed by: OBSTETRICS & GYNECOLOGY

## 2023-12-12 RX ORDER — VALACYCLOVIR HYDROCHLORIDE 500 MG/1
500 TABLET, FILM COATED ORAL DAILY
Qty: 30 TABLET | Refills: 3 | Status: CANCELLED | OUTPATIENT
Start: 2023-12-12

## 2023-12-12 RX ORDER — LEVOTHYROXINE, LIOTHYRONINE 9.5; 2.25 UG/1; UG/1
15 TABLET ORAL EVERY MORNING
Qty: 30 TABLET | Refills: 6 | Status: SHIPPED | OUTPATIENT
Start: 2023-12-12

## 2023-12-12 RX ORDER — LEVOTHYROXINE, LIOTHYRONINE 9.5; 2.25 UG/1; UG/1
15 TABLET ORAL EVERY MORNING
Qty: 90 TABLET | Refills: 3 | Status: SHIPPED | OUTPATIENT
Start: 2023-12-12

## 2023-12-12 NOTE — TELEPHONE ENCOUNTER
----- Message from Sasha Farfan sent at 12/12/2023 10:50 AM CST -----  Type:  Needs Medical Advice    Who Called:  Pt    Would the patient rather a call back or a response via [facility]?  call  Best Call Back Number:   [phone]  Additional Information:  Pt would like a call back from Rn regarding an order for calcium scoring test.  Pt is requesting one.

## 2023-12-12 NOTE — PROGRESS NOTES
HPI:  62 y.o.   OB History          0    Para   0    Term   0       0    AB   0    Living   0         SAB   0    IAB   0    Ectopic   0    Multiple   0    Live Births   0              No LMP recorded. Patient has had a hysterectomy.   Patient here for her annual gynecologic exam.  She has no complaints at this time.    ROS:  GENERAL: No fever, chills, fatigability or weight loss.  SKIN: No rashes, itching or changes in color or texture of skin.  HEAD: No headaches or recent head trauma.  EYES: Visual acuity fine. No photophobia, ocular pain or diplopia.  EARS: Denies ear pain, discharge or vertigo.  NOSE: No loss of smell, no epistaxis or postnasal drip.  MOUTH & THROAT: No hoarseness or change in voice. No excessive gum bleeding.  NODES: Denies swollen glands.  CHEST: Denies TERRELL, cyanosis, wheezing, cough and sputum production.  CARDIOVASCULAR: Denies chest pain, PND, orthopnea or reduced exercise tolerance.  ABDOMEN: Appetite fine. No weight loss. Denies diarrhea, abdominal pain, hematemesis or blood in stool.  URINARY: No flank pain, dysuria or hematuria.  PERIPHERAL VASCULAR: No claudication or cyanosis.  MUSCULOSKELETAL: No joint stiffness or swelling. Denies back pain.  NEUROLOGIC: No history of seizures, paralysis, alteration of gait or coordination.    PE:   /75   Wt 68.8 kg (151 lb 10.8 oz)   BMI 26.04 kg/m²   APPEARANCE: Well nourished, well developed, in no acute distress.  NECK: Neck symmetric without masses or thyromegaly.  NODES: No inguinal lymph node enlargement.  ABDOMEN: Soft. No tenderness or masses. No hepatosplenomegaly. No hernias.  BREASTS: Symmetrical, no skin changes or visible lesions. No palpable masses, nipple discharge or adenopathy bilaterally.  PELVIC: Normal external female genitalia without lesions. Normal hair distribution. Adequate perineal body, normal urethral meatus. Vagina moist and well rugated without lesions or discharge. No significant cystocele or  rectocele. Uterus and cervix surgically absent. Bimanual exam revealed no masses, tenderness or abnormality.    Procedure:  Pap Smear    Assessment:  Normal Gynecologic Exam    Plan:  Mammogram and Colonoscopy as per current recommendations.   Return to clinic in one year or for any problems or complaints.

## 2023-12-13 ENCOUNTER — PATIENT MESSAGE (OUTPATIENT)
Dept: OBSTETRICS AND GYNECOLOGY | Facility: CLINIC | Age: 63
End: 2023-12-13
Payer: MEDICARE

## 2023-12-13 RX ORDER — VALACYCLOVIR HYDROCHLORIDE 500 MG/1
500 TABLET, FILM COATED ORAL 2 TIMES DAILY
Qty: 60 TABLET | Refills: 4 | Status: SHIPPED | OUTPATIENT
Start: 2023-12-13

## 2023-12-19 ENCOUNTER — TELEPHONE (OUTPATIENT)
Dept: OBSTETRICS AND GYNECOLOGY | Facility: CLINIC | Age: 63
End: 2023-12-19
Payer: MEDICARE

## 2023-12-19 NOTE — TELEPHONE ENCOUNTER
----- Message from Dkaota Obregon sent at 12/18/2023 12:57 PM CST -----  Contact: pt  Type: Requesting to speak with nurse        Who Called: PT  Regarding: personal   Would the patient rather a call back or a response via [facility]? Call back  Best Call Back Number: [phone]  Additional Information:

## 2023-12-20 ENCOUNTER — OFFICE VISIT (OUTPATIENT)
Dept: OTOLARYNGOLOGY | Facility: CLINIC | Age: 63
End: 2023-12-20
Payer: COMMERCIAL

## 2023-12-20 ENCOUNTER — PATIENT MESSAGE (OUTPATIENT)
Dept: OTOLARYNGOLOGY | Facility: CLINIC | Age: 63
End: 2023-12-20

## 2023-12-20 DIAGNOSIS — H61.22 EXCESSIVE CERUMEN IN EAR CANAL, LEFT: Primary | ICD-10-CM

## 2023-12-20 DIAGNOSIS — H92.01 RIGHT EAR PAIN: ICD-10-CM

## 2023-12-20 PROCEDURE — 99203 PR OFFICE/OUTPT VISIT, NEW, LEVL III, 30-44 MIN: ICD-10-PCS | Mod: S$GLB,,, | Performed by: NURSE PRACTITIONER

## 2023-12-20 PROCEDURE — 99203 OFFICE O/P NEW LOW 30 MIN: CPT | Mod: S$GLB,,, | Performed by: NURSE PRACTITIONER

## 2023-12-20 NOTE — PROGRESS NOTES
Subjective:   Mago Cleaning is a 63 y.o. female who was self-referred for  ear check . She presents with her  for an ear check. She denies any otalgia, otorrhea, ear fullness/pressure, tinnitus or vertigo. Overall she feels that she hears well. About 1 month ago she was having intermittent right ear pain that was sharp and shooting in nature. It has since improved. She recently saw her dentist who has concerns for clenching and grinding and recommended a mouth guard. She also chews gums regularly. There is not a family history of hearing loss at a young age. There is not a prior history of ear surgery. There is not a prior history of ear infections. There is not a history of ear trauma. She denies a history of significant noise exposure.     Past Surgical History  She has a past surgical history that includes Hysterectomy; Appendectomy; and Arthroplasty of hip by anterior approach (Right, 10/8/2021).    Family History  Her family history includes Dementia in her mother; Diabetes in her father; Stroke in her father and mother.    Social History  She reports that she quit smoking about 10 years ago. Her smoking use included cigarettes. She has never used smokeless tobacco. She reports current alcohol use of about 1.0 standard drink of alcohol per week. She reports that she does not use drugs.    Allergies  She is allergic to venom-honey bee and benadryl [diphenhydramine hcl].    Medications  She has a current medication list which includes the following prescription(s): acyclovir, b complex vitamins, chromium picolinate, co-enzyme q-10, estradiol, glutamine, magnesium oxide,aspartate,citr, multivitamin, neomycin-polymyxin-dexamethasone, np thyroid, np thyroid, selenium, spironolactone, UNABLE TO FIND, UNABLE TO FIND, UNABLE TO FIND, valacyclovir, valacyclovir, and zinc gluconate.  Review of Systems     Constitutional: Negative for chills and fever.      HENT: Negative for ear discharge, ear infection,  ear pain, hearing loss and ringing in the ears.      Neurological: Negative for dizziness, headaches and light-headedness.        Objective:     Constitutional:   She is oriented to person, place, and time. She appears well-developed and well-nourished. She appears alert. She is cooperative.  Non-toxic appearance. She does not have a sickly appearance. She does not appear ill. Normal speech.      Head:  Normocephalic and atraumatic. Not macrocephalic and not microcephalic. Head is without abrasion, without right periorbital erythema, without left periorbital erythema and without TMJ tenderness.     Ears:    Right Ear: No drainage, swelling or tenderness. No mastoid tenderness. Tympanic membrane is not scarred, not perforated, not erythematous, not retracted and not bulging. No middle ear effusion.   Left Ear: No drainage, swelling or tenderness. No mastoid tenderness. Tympanic membrane is not scarred, not perforated, not erythematous, not retracted and not bulging.  No middle ear effusion.   Ears:      Pulmonary/Chest:   Effort normal.     Psychiatric:   She has a normal mood and affect. Her speech is normal and behavior is normal.     Neurological:   She is alert and oriented to person, place, and time.     Procedure  Cerumen removal performed.  See procedure note.    Procedure Note:  The patient was brought to the minor procedure room and placed under the operating microscope of the left ear canal which was cleaned of ceruminous debris. Using a combination of suction, curettes and cup forceps the patient's cerumen was removed. The patient tolerated the procedure well. There were no complications.    Assessment:     1. Excessive cerumen in ear canal, left    2. Right ear pain      Plan:   Excessive cerumen in ear canal, left  Left ear cerumen removed under microscopy. Patient tolerated procedure well and noted improvement upon impaction removal. Education about normal ear hygiene and the avoidance of Q-tips was  reviewed.      Right ear pain  Has improved in the past month. Ear exam WNL, no signs of infection. Suspect possible TMJ. Discussed with patient the etiology of TMJ syndrome and management strategies. Follow-up if symptoms persist.

## 2023-12-21 PROBLEM — E03.9 HYPOTHYROIDISM: Status: ACTIVE | Noted: 2023-10-19

## 2023-12-21 PROBLEM — Z96.641 PRESENCE OF RIGHT ARTIFICIAL HIP JOINT: Status: ACTIVE | Noted: 2021-10-11

## 2023-12-21 PROBLEM — G25.0 ESSENTIAL TREMOR: Status: ACTIVE | Noted: 2023-10-19

## 2023-12-21 PROBLEM — N95.1 VASOMOTOR SYMPTOMS DUE TO MENOPAUSE: Status: ACTIVE | Noted: 2023-10-19

## 2023-12-21 PROBLEM — B00.9 HSV (HERPES SIMPLEX VIRUS) INFECTION: Status: ACTIVE | Noted: 2023-10-19

## 2023-12-27 LAB
FINAL PATHOLOGIC DIAGNOSIS: NORMAL
Lab: NORMAL

## 2024-02-15 ENCOUNTER — TELEPHONE (OUTPATIENT)
Dept: OBSTETRICS AND GYNECOLOGY | Facility: CLINIC | Age: 64
End: 2024-02-15
Payer: MEDICARE

## 2024-02-15 NOTE — TELEPHONE ENCOUNTER
----- Message from Dakota Obregon sent at 2/15/2024 12:00 PM CST -----  Contact: pt  Type: Requesting to speak with nurse        Who Called: PT  Regarding: would like to start the estra test.   Would the patient rather a call back or a response via [facility]? Call back  Best Call Back Number: [phone]  Additional Information:

## 2024-02-16 ENCOUNTER — PATIENT MESSAGE (OUTPATIENT)
Dept: OBSTETRICS AND GYNECOLOGY | Facility: CLINIC | Age: 64
End: 2024-02-16
Payer: MEDICARE

## 2024-02-16 ENCOUNTER — TELEPHONE (OUTPATIENT)
Dept: OBSTETRICS AND GYNECOLOGY | Facility: CLINIC | Age: 64
End: 2024-02-16
Payer: MEDICARE

## 2024-02-16 RX ORDER — ESTERIFIED ESTROGEN AND METHYLTESTOSTERONE .625; 1.25 MG/1; MG/1
1 TABLET ORAL
Qty: 30 TABLET | Refills: 1 | Status: SHIPPED | OUTPATIENT
Start: 2024-02-16 | End: 2025-02-15

## 2024-02-16 NOTE — TELEPHONE ENCOUNTER
----- Message from Hannah Avila sent at 2/16/2024  1:42 PM CST -----  Type:  Needs Medical Advice    Who Called: pt  Would the patient rather a call back or a response via [facility]? call  Best Call Back Number:  [phone]  Additional Information: pt would like to know is medication   estrogens,esterified,-methyltestosterone 0.625-1.25mg (ESTRATEST HS) per tablet will be replacing her medication estradioL (ESTRACE) 1 MG tablet taking care of her hot flashes has been trying to get in contact with office

## 2024-02-16 NOTE — TELEPHONE ENCOUNTER
Returned pt call and would like to know is medication   estrogens,esterified,-methyltestosterone 0.625-1.25mg (ESTRATEST HS) per tablet will be replacing her medication estradioL (ESTRACE) 1 MG tablet taking care of her hot flashes   Has been on estradiol for 20 years since hysterectomy the estrogen doesn't contain estradiol. Please advise

## 2024-11-15 ENCOUNTER — CLINICAL SUPPORT (OUTPATIENT)
Dept: REHABILITATION | Facility: HOSPITAL | Age: 64
End: 2024-11-15
Payer: MEDICARE

## 2024-11-15 DIAGNOSIS — R29.898 WEAKNESS OF RIGHT LOWER EXTREMITY: Primary | ICD-10-CM

## 2024-11-15 DIAGNOSIS — R29.898 HIP TIGHTNESS: ICD-10-CM

## 2024-11-15 PROCEDURE — 97110 THERAPEUTIC EXERCISES: CPT

## 2024-11-15 PROCEDURE — 97161 PT EVAL LOW COMPLEX 20 MIN: CPT

## 2024-11-15 NOTE — PLAN OF CARE
[facility]   Physical Therapy Initial Evaluation      Date: 11/15/2024   Name: Mago Levi [facility] Number: [id number]    Therapy Diagnosis:   Encounter Diagnoses   Name Primary?    Weakness of right lower extremity Yes    Hip tightness      Physician: Misha Barajas MD    Physician Orders: PT Eval and Treat   Medical Diagnosis from Referral: R10.31 (ICD-10-CM) - Right groin pain   Evaluation Date: 11/15/2024  Authorization Period Expiration: TBD  Plan of Care Expiration: 1/15/2025  Progress Note Due: 12/15/2024  Visit # / Visits authorized: 1/1   FOTO: 1/5    FOTO Initial Evaluation: 61  FOTO 2nd F/U: NA  FOTO Discharge: NA    Precautions: Standard     Time In: 1045  Time Out: 1130   Total Appointment Time (timed & untimed codes): 45 minutes      SUBJECTIVE     Date of onset: A Year     History of current condition - Mago reports: she had a hip replacement three years ago on the R side. Patient reports about a year ago she started having R lower back pain that radiated into the R thigh. Patient thinks this was due to a lot of core work at an exercise class. Patient reports it kept getting bad to where she went back to the doctor. Patient reports she got an MRI on her back which showed a bulging disc at the L4-5 and L5-S1 region. Patient reports she had an epidural injection in the L4-L5 and L5-S1 region. This has helped her back pain and pain into her thigh. However, patient reports pain in the groin with seated hip flexion. Also going into a seated position. Patient reports the pain can range from moderate to severe. Patient reports since the epidural she has not noticed much of severe pain. Patient reports she is going to [place] in a week. Patient reports walking does not bother her overall.       Imaging, MRI studies: Bulging disc at L4-L5 and L5-S1    Prior Therapy: None   Social History:  lives with their spouse  Occupation: Retired   Prior Level of Function: Independent  with ADLs , exercise   Current Level of Function: Independent with ADLs, not exercising as much     Pain:  Current 0/10, worst 6/10, best 0/10   Location: right groin    Description: Sharp / Stabbing   Aggravating Factors: Seated hip flexion and getting in/out of car   Easing Factors: rest, walking     Patients goals: to reduce groin pain      Medical History:   Past Medical History:   Diagnosis Date    Arthritis        Surgical History:   Mago Cleaning  has a past surgical history that includes Hysterectomy; Appendectomy; and Arthroplasty of hip by anterior approach (Right, 10/8/2021).    Medications:   Mago has a current medication list which includes the following prescription(s): acyclovir, b complex vitamins, chromium picolinate, co-enzyme q-10, estradiol, estrogens(esterified)-methyltestosterone 0.625-1.25mg, glutamine, magnesium aspart,citrate,oxide, multivitamin, neomycin-polymyxin-dexamethasone, np thyroid, np thyroid, selenium, spironolactone, UNABLE TO FIND, UNABLE TO FIND, UNABLE TO FIND, valacyclovir, valacyclovir, and zinc gluconate.    Allergies:   Review of patient's allergies indicates:   Allergen Reactions    Venom-honey bee Swelling    Benadryl [diphenhydramine hcl] Itching          OBJECTIVE         L/E Strength w/ MicroFET Muscle Alec Dynamometer Right Left Pain/Dysfunction with Movement   (approx 4 sec hold w/ max contraction)   Hip Flexion 8.7 kg  10.6 kg     Hip Abduction 6.5 kg  8.9 kg     Quadriceps 11.2 kg  17.4 kg     Hamstrings 5.3 kg  7.6 kg             Hip Right Right Right Left Left Left Pain/Dysfunction with Movement    AROM PROM MMT AROM PROM MMT    Flexion WNL WNL 4/5 WNL WNL 4+/5    Extension 10 13 4/5 15 20 4+/5    Abduction WNL WNL 4/5 WNL WNL 4+/5    Adduction WNL WNL 4/5 WNL WNL 4+/5    Internal rotation 25 30 4/5 25 30 4+/5    External rotation 50 55 4/5 50 55 4+/5       Knee Right Right Left Left Pain/Dysfunction with Movement    AROM PROM AROM PROM    Flexion  WNL WNL WNL WNL    Extension WNL WNL WNL WNL          Hip Flexibility:   - Shant Test: Tightness on the R (+)    FADIR (SOY): Negative     REBEKAH (impingement) : Negative           FOTO Hip Survey    Therapist reviewed FOTO scores for Mago Cleaning on 11/15/2024.   FOTO documents entered into [facility] - see Media section.    Intake Score: 61         TREATMENT     Total Treatment time (time-based codes) separate from Evaluation: 8 minutes      Mago received the treatments listed below:      therapeutic exercises to develop ROM and flexibility for 8 minutes including:    Hip flexor stretching 30s x 4   Education on stretching and condition of the hip      PATIENT EDUCATION AND HOME EXERCISES     Education provided:   - Purpose of PT      Written Home Exercises Provided: yes. Exercises were reviewed and Mago was able to demonstrate them prior to the end of the session.  Mago demonstrated good  understanding of the education provided. See EMR under Patient Instructions for exercises provided during therapy sessions.    ASSESSMENT     Mago is a 63 y.o. female referred to outpatient Physical Therapy with a medical diagnosis of R10.31 (ICD-10-CM) - Right groin pain. Patient presents with a chronic history of R hip pain that is primarily with seated hip flexion or going into a seated position. Patient has good range of motion of the hip overall but does have tightness with hip flexors on the R. Patient also presents with weakness of the R hip and leg overall. Patient was progressed with hip flexor stretching today and did well with this. Patient was educated to stretch with a mild stretch sensation and no pain.     Patient prognosis is Good.   Patient will benefit from skilled outpatient Physical Therapy to address the deficits stated above and in the chart below, provide patient /family education, and to maximize patientt's level of independence.     Plan of care discussed with patient: Yes  Patient's  spiritual, cultural and educational needs considered and patient is agreeable to the plan of care and goals as stated below:     Anticipated Barriers for therapy: Chronic symptoms     Medical Necessity is demonstrated by the following  History  Co-morbidities and personal factors that may impact the plan of care Co-morbidities:   None    Personal Factors:   no deficits     low   Examination  Body Structures and Functions, activity limitations and participation restrictions that may impact the plan of care Body Regions:   lower extremities    Body Systems:    ROM  strength    Participation Restrictions:   None    Activity limitations:   Learning and applying knowledge  no deficits    General Tasks and Commands  no deficits    Communication  no deficits    Mobility  no deficits    Self care  no deficits    Domestic Life  no deficits    Interactions/Relationships  no deficits    Life Areas  no deficits    Community and Social Life  recreation and leisure         moderate   Clinical Presentation stable and uncomplicated low   Decision Making/ Complexity Score: low     Goals:  Short Term Goals (3 Weeks):   1. Pt will be compliant with HEP to supplement PT in restoring pain free function.  2. Pt will improve impaired R LE strength to be 75% of the L leg  to improve strength for functional tasks  3. Pt improve impaired hip ROM by 5 deg in abnormal planes to improve mobility for normal movement patterns.   Long Term Goals (6 Weeks):  1. Pt will improve FOTO score to >/= 75 to decrease perceived limitation with mobility  2. Pt will improve impaired R LE strength to be >90% of the L leg  to improve strength for functional tasks  3. Pt improve impaired hip ROM to WNL in all planes to improve mobility for normal movement patterns.   4. Pt will have no pain with daily activities in the R hip to tolerate functional tasks without pain      PLAN   Plan of care Certification: 11/15/2024 to 1/15/2025.    Outpatient Physical Therapy 2  times weekly for 6 weeks to include the following interventions: Manual Therapy, Moist Heat/ Ice, Neuromuscular Re-ed, Patient Education, Self Care, Therapeutic Activities, Therapeutic Exercise, and FDN.     Sergo Grubbs, PT      I CERTIFY THE NEED FOR THESE SERVICES FURNISHED UNDER THIS PLAN OF TREATMENT AND WHILE UNDER MY CARE   Physician's comments:     Physician's Signature: ___________________________________________________

## 2024-11-19 ENCOUNTER — CLINICAL SUPPORT (OUTPATIENT)
Dept: REHABILITATION | Facility: HOSPITAL | Age: 64
End: 2024-11-19
Payer: MEDICARE

## 2024-11-19 DIAGNOSIS — R29.898 HIP TIGHTNESS: ICD-10-CM

## 2024-11-19 DIAGNOSIS — R29.898 WEAKNESS OF RIGHT LOWER EXTREMITY: Primary | ICD-10-CM

## 2024-11-19 PROCEDURE — 97110 THERAPEUTIC EXERCISES: CPT

## 2024-11-19 NOTE — PROGRESS NOTES
[facility]   Physical Therapy Treatment Note      [id number]    Therapy Diagnosis:   Encounter Diagnoses   Name Primary?    Weakness of right lower extremity Yes    Hip tightness      Physician: Misha Barajas MD    Visit Date: 11/19/2024    Physician Orders: PT Eval and Treat   Medical Diagnosis from Referral: R10.31 (ICD-10-CM) - Right groin pain   Evaluation Date: 11/15/2024  Authorization Period Expiration: TBD  Plan of Care Expiration: 1/15/2025  Progress Note Due: 12/15/2024  Visit # / Visits authorized: 1/1 ,1/10  FOTO: 1/5     FOTO Initial Evaluation: 61  FOTO 2nd F/U: NA  FOTO Discharge: NA     Precautions: Standard      Time In: 230  Time Out: 315   Total Appointment Time (timed & untimed codes): 45 minutes    PTA Visit #: 0/5       Subjective     Patient reports: she is going to [facility] tomorrow. Patient reports she was doing good but she was packing and trying on a bunch of pants which caused her soreness.  She was compliant with home exercise program.  Response to previous treatment: No adverse effects   Functional change: No change     Pain: 1/10  Location: right groin and upper thigh       Objective      Initial Eval       L/E Strength w/ MicroFET Muscle Alec Dynamometer Right Left Pain/Dysfunction with Movement   (approx 4 sec hold w/ max contraction)   Hip Flexion 8.7 kg  10.6 kg      Hip Abduction 6.5 kg  8.9 kg      Quadriceps 11.2 kg  17.4 kg      Hamstrings 5.3 kg  7.6 kg                  Hip Right Right Right Left Left Left Pain/Dysfunction with Movement     AROM PROM MMT AROM PROM MMT     Flexion WNL WNL 4/5 WNL WNL 4+/5     Extension 10 13 4/5 15 20 4+/5     Abduction WNL WNL 4/5 WNL WNL 4+/5     Adduction WNL WNL 4/5 WNL WNL 4+/5     Internal rotation 25 30 4/5 25 30 4+/5     External rotation 50 55 4/5 50 55 4+/5        Knee Right Right Left Left Pain/Dysfunction with Movement     AROM PROM AROM PROM     Flexion WNL WNL  WNL WNL     Extension WNL WNL WNL WNL              Hip Flexibility:   - Shant Test: Tightness on the R (+)     FADIR (SOY): Negative      REBEKAH (impingement) : Negative           Treatment     Mago received the treatments listed below:      therapeutic exercises to develop strength, endurance, ROM, and flexibility for 45 minutes including:      Bike 7 min   Hip flexor stretching 30s x 4   Prone hip flexor stretch 30s x 4   Bent knee fall out x15  Hip REBEKAH Stretch 30s x 3   SAQ 4# 3x10  SLR 2x10           manual therapy techniques: Joint mobilizations were applied to the: R hip for 0 minutes, including:    Hip traction (long axis)     neuromuscular re-education activities to improve: Coordination, Kinesthetic, Proprioception, Posture, and Motor Control for 0 minutes. The following activities were included:      therapeutic activities to improve functional performance for 0  minutes, including:          Patient Education and Home Exercises       Education provided:   - [facility]      Access Code: [email or web address]  URL: [email or web address]  Date: 11/19/2024  Prepared by: Sergo Grubbs    Exercises  - Prone Hip Flexor Stretch on Table with Strap  - 1 x daily - 7 x weekly - 4 sets - 30 seconds hold  - Half Kneeling Hip Flexor Stretch  - 1 x daily - 7 x weekly - 4 sets - 30 seconds hold  - Bent Knee Fallouts with Alternating Legs  - 1 x daily - 7 x weekly - 2 sets - 10 reps  - Supine Figure 4 Piriformis Stretch  - 1 x daily - 7 x weekly - 4 sets - 30 seconds hold  - Seated Long Arc Quad  - 1 x daily - 7 x weekly - 3 sets - 10 reps  - Active Straight Leg Raise with Quad Set  - 1 x daily - 7 x weekly - 3 sets - 10 reps    Written Home Exercises Provided: Yes. Exercises were reviewed and Mago was able to demonstrate them prior to the end of the session.  Mago demonstrated good  understanding of the education provided. See Electronic Medical Record under Patient Instructions for exercises provided during therapy  sessions    Assessment     Patient presents to PT today with R groin pain today with hip flexion activities. Patient was progressed with TE today to address hip pain, tightness, and weakness. Patient did well from today without adverse effects. Patient is going out of town and will continue HEP while gone.     Mago Is progressing well towards her goals.   Patient prognosis is Good.     Patient will continue to benefit from skilled outpatient physical therapy to address the deficits listed in the problem list box on initial evaluation, provide pt/family education and to maximize pt's level of independence in the home and community environment.     Patient's spiritual, cultural and educational needs considered and pt agreeable to plan of care and goals.     Anticipated barriers to physical therapy: Chronic symptoms    Goals:   Short Term Goals (3 Weeks):   1. Pt will be compliant with HEP to supplement PT in restoring pain free function.  2. Pt will improve impaired R LE strength to be 75% of the L leg  to improve strength for functional tasks  3. Pt improve impaired hip ROM by 5 deg in abnormal planes to improve mobility for normal movement patterns.   Long Term Goals (6 Weeks):  1. Pt will improve FOTO score to >/= 75 to decrease perceived limitation with mobility  2. Pt will improve impaired R LE strength to be >90% of the L leg  to improve strength for functional tasks  3. Pt improve impaired hip ROM to WNL in all planes to improve mobility for normal movement patterns.   4. Pt will have no pain with daily activities in the R hip to tolerate functional tasks without pain        PLAN   Plan of care Certification: 11/15/2024 to 1/15/2025.     Outpatient Physical Therapy 2 times weekly for 6 weeks to include the following interventions: Manual Therapy, Moist Heat/ Ice, Neuromuscular Re-ed, Patient Education, Self Care, Therapeutic Activities, Therapeutic Exercise, and FDN.     Sergo Grubbs, PT

## 2024-12-05 ENCOUNTER — CLINICAL SUPPORT (OUTPATIENT)
Dept: REHABILITATION | Facility: HOSPITAL | Age: 64
End: 2024-12-05
Payer: MEDICARE

## 2024-12-05 DIAGNOSIS — R29.898 WEAKNESS OF RIGHT LOWER EXTREMITY: Primary | ICD-10-CM

## 2024-12-05 DIAGNOSIS — R29.898 HIP TIGHTNESS: ICD-10-CM

## 2024-12-05 PROCEDURE — 97110 THERAPEUTIC EXERCISES: CPT

## 2024-12-05 NOTE — PROGRESS NOTES
[facility]   Physical Therapy Treatment Note      [id number]    Therapy Diagnosis:   Encounter Diagnoses   Name Primary?    Weakness of right lower extremity Yes    Hip tightness      Physician: Misha Barajas MD    Visit Date: 12/5/2024    Physician Orders: PT Eval and Treat   Medical Diagnosis from Referral: R10.31 (ICD-10-CM) - Right groin pain   Evaluation Date: 11/15/2024  Authorization Period Expiration: TBD  Plan of Care Expiration: 1/15/2025  Progress Note Due: 12/15/2024  Visit # / Visits authorized: 1/1, 2/10  FOTO: 3/5     FOTO Initial Evaluation: 61  FOTO 2nd F/U: NA  FOTO Discharge: NA     Precautions: Standard      Time In: 145  Time Out: 230   Total Appointment Time (timed & untimed codes): 45 minutes    PTA Visit #: 0/5       Subjective     Patient reports: she has returned from [facility] and is feeling much better since last session. Patient is having less intense pain overall, but continues to have mild R hip pain with hip flexion and getting out the car.           She was compliant with home exercise program.  Response to previous treatment: No adverse effects   Functional change: No change     Pain: 1/10  Location: right groin and upper thigh       Objective      Initial Eval       L/E Strength w/ MicroFET Muscle Alec Dynamometer Right Left Pain/Dysfunction with Movement   (approx 4 sec hold w/ max contraction)   Hip Flexion 8.7 kg  10.6 kg      Hip Abduction 6.5 kg  8.9 kg      Quadriceps 11.2 kg  17.4 kg      Hamstrings 5.3 kg  7.6 kg                  Hip Right Right Right Left Left Left Pain/Dysfunction with Movement     AROM PROM MMT AROM PROM MMT     Flexion WNL WNL 4/5 WNL WNL 4+/5     Extension 10 13 4/5 15 20 4+/5     Abduction WNL WNL 4/5 WNL WNL 4+/5     Adduction WNL WNL 4/5 WNL WNL 4+/5     Internal rotation 25 30 4/5 25 30 4+/5     External rotation 50 55 4/5 50 55 4+/5        Knee Right Right Left Left  Pain/Dysfunction with Movement     AROM PROM AROM PROM     Flexion WNL WNL WNL WNL     Extension WNL WNL WNL WNL              Hip Flexibility:   - Shant Test: Tightness on the R (+)     FADIR (SOY): Negative      REBEKAH (impingement) : Negative           Treatment     Mago received the treatments listed below:      therapeutic exercises to develop strength, endurance, ROM, and flexibility for 45  minutes including:      Bike 7 min   Hip flexor stretching 30s x 4   Prone hip flexor stretch 30s x 4   Bent knee fall out x15  Hip REBEKAH Stretch 30s x 4   SAQ 4# 3x10  SLR 2x10   Supine marches 30x        manual therapy techniques: Joint mobilizations were applied to the: R hip for 0 minutes, including:    Hip traction (long axis)     neuromuscular re-education activities to improve: Coordination, Kinesthetic, Proprioception, Posture, and Motor Control for 0 minutes. The following activities were included:      therapeutic activities to improve functional performance for 0  minutes, including:          Patient Education and Home Exercises       Education provided:   - [facility]      Access Code: [email or web address]  URL: [email or web address]  Date: 11/19/2024  Prepared by: Sergo Grubbs    Exercises  - Prone Hip Flexor Stretch on Table with Strap  - 1 x daily - 7 x weekly - 4 sets - 30 seconds hold  - Half Kneeling Hip Flexor Stretch  - 1 x daily - 7 x weekly - 4 sets - 30 seconds hold  - Bent Knee Fallouts with Alternating Legs  - 1 x daily - 7 x weekly - 2 sets - 10 reps  - Supine Figure 4 Piriformis Stretch  - 1 x daily - 7 x weekly - 4 sets - 30 seconds hold  - Seated Long Arc Quad  - 1 x daily - 7 x weekly - 3 sets - 10 reps  - Active Straight Leg Raise with Quad Set  - 1 x daily - 7 x weekly - 3 sets - 10 reps    Written Home Exercises Provided: Yes. Exercises were reviewed and Mago was able to demonstrate them prior to the end of the session.  Mago demonstrated good  understanding of the education provided. See  Electronic Medical Record under Patient Instructions for exercises provided during therapy sessions    Assessment     Patient presents to PT today with R groin pain today with hip flexion activities. Patient was progressed with TE today to address hip pain, tightness, and weakness. Patient did well from today without adverse effects.   Mago Is progressing well towards her goals.   Patient prognosis is Good.     Patient will continue to benefit from skilled outpatient physical therapy to address the deficits listed in the problem list box on initial evaluation, provide pt/family education and to maximize pt's level of independence in the home and community environment.     Patient's spiritual, cultural and educational needs considered and pt agreeable to plan of care and goals.     Anticipated barriers to physical therapy: Chronic symptoms    Goals:   Short Term Goals (3 Weeks):   1. Pt will be compliant with HEP to supplement PT in restoring pain free function.  2. Pt will improve impaired R LE strength to be 75% of the L leg  to improve strength for functional tasks  3. Pt improve impaired hip ROM by 5 deg in abnormal planes to improve mobility for normal movement patterns.   Long Term Goals (6 Weeks):  1. Pt will improve FOTO score to >/= 75 to decrease perceived limitation with mobility  2. Pt will improve impaired R LE strength to be >90% of the L leg  to improve strength for functional tasks  3. Pt improve impaired hip ROM to WNL in all planes to improve mobility for normal movement patterns.   4. Pt will have no pain with daily activities in the R hip to tolerate functional tasks without pain        PLAN   Plan of care Certification: 11/15/2024 to 1/15/2025.     Outpatient Physical Therapy 2 times weekly for 6 weeks to include the following interventions: Manual Therapy, Moist Heat/ Ice, Neuromuscular Re-ed, Patient Education, Self Care, Therapeutic Activities, Therapeutic Exercise, and FDNPedro Trotter  Humera, PT, DPT

## 2024-12-10 ENCOUNTER — CLINICAL SUPPORT (OUTPATIENT)
Dept: REHABILITATION | Facility: HOSPITAL | Age: 64
End: 2024-12-10
Payer: MEDICARE

## 2024-12-10 DIAGNOSIS — R29.898 WEAKNESS OF RIGHT LOWER EXTREMITY: Primary | ICD-10-CM

## 2024-12-10 DIAGNOSIS — R29.898 HIP TIGHTNESS: ICD-10-CM

## 2024-12-10 PROCEDURE — 97112 NEUROMUSCULAR REEDUCATION: CPT

## 2024-12-10 PROCEDURE — 97110 THERAPEUTIC EXERCISES: CPT

## 2024-12-10 NOTE — PROGRESS NOTES
[facility]   Physical Therapy Treatment Note      [id number]    Therapy Diagnosis:   Encounter Diagnoses   Name Primary?    Weakness of right lower extremity Yes    Hip tightness        Physician: Misha Barajas MD    Visit Date: 12/10/2024    Physician Orders: PT Eval and Treat   Medical Diagnosis from Referral: R10.31 (ICD-10-CM) - Right groin pain   Evaluation Date: 11/15/2024  Authorization Period Expiration: TBD  Plan of Care Expiration: 1/15/2025  Progress Note Due: 12/15/2024  Visit # / Visits authorized: 1/1, 3/10  FOTO: 4/5     FOTO Initial Evaluation: 61  FOTO 2nd F/U: NA  FOTO Discharge: NA     Precautions: Standard      Time In: 1115  Time Out: 1200   Total Appointment Time (timed & untimed codes): 45 minutes    PTA Visit #: 0/5       Subjective     Patient reports: she is having less pain with getting in and out of the car in regard to her R hip/groin. Patient felt back pain after the last visit on the following day. However her back feels better today           She was compliant with home exercise program.  Response to previous treatment: No adverse effects   Functional change: No change     Pain: 1/10  Location: right groin and upper thigh       Objective      Initial Eval       L/E Strength w/ MicroFET Muscle Alec Dynamometer Right Left Pain/Dysfunction with Movement   (approx 4 sec hold w/ max contraction)   Hip Flexion 8.7 kg  10.6 kg      Hip Abduction 6.5 kg  8.9 kg      Quadriceps 11.2 kg  17.4 kg      Hamstrings 5.3 kg  7.6 kg                  Hip Right Right Right Left Left Left Pain/Dysfunction with Movement     AROM PROM MMT AROM PROM MMT     Flexion WNL WNL 4/5 WNL WNL 4+/5     Extension 10 13 4/5 15 20 4+/5     Abduction WNL WNL 4/5 WNL WNL 4+/5     Adduction WNL WNL 4/5 WNL WNL 4+/5     Internal rotation 25 30 4/5 25 30 4+/5     External rotation 50 55 4/5 50 55 4+/5        Knee Right Right Left Left Pain/Dysfunction  with Movement     AROM PROM AROM PROM     Flexion WNL WNL WNL WNL     Extension WNL WNL WNL WNL              Hip Flexibility:   - Shant Test: Tightness on the R (+)     FADIR (SOY): Negative      REBEKAH (impingement) : Negative           Treatment     Mago received the treatments listed below:      therapeutic exercises to develop strength, endurance, ROM, and flexibility for 25  minutes including:      Bike 7 min   Hip flexor stretching 30s x 4   Prone hip flexor stretch 30s x 4   SLR 2x10  Hip REBEKAH Stretch 30s x 4     NT  SAQ 4# 3x10        manual therapy techniques: Joint mobilizations were applied to the: R hip for 0 minutes, including:    Hip traction (long axis)     neuromuscular re-education activities to improve: Coordination, Kinesthetic, Proprioception, Posture, and Motor Control for 20 minutes. The following activities were included:    TA Activation   - 15x for 3s   Bent knee fall out 2x10  - with TA   TA Activation w/ marching       therapeutic activities to improve functional performance for 0  minutes, including:          Patient Education and Home Exercises       Education provided:   - [facility]      Access Code: [email or web address]  URL: [email or web address]  Date: 11/19/2024  Prepared by: Sergo Grubbs    Exercises  - Prone Hip Flexor Stretch on Table with Strap  - 1 x daily - 7 x weekly - 4 sets - 30 seconds hold  - Half Kneeling Hip Flexor Stretch  - 1 x daily - 7 x weekly - 4 sets - 30 seconds hold  - Bent Knee Fallouts with Alternating Legs  - 1 x daily - 7 x weekly - 2 sets - 10 reps  - Supine Figure 4 Piriformis Stretch  - 1 x daily - 7 x weekly - 4 sets - 30 seconds hold  - Seated Long Arc Quad  - 1 x daily - 7 x weekly - 3 sets - 10 reps  - Active Straight Leg Raise with Quad Set  - 1 x daily - 7 x weekly - 3 sets - 10 reps    Written Home Exercises Provided: Yes. Exercises were reviewed and Mago was able to demonstrate them prior to the end of the session.  Mago demonstrated good   understanding of the education provided. See Electronic Medical Record under Patient Instructions for exercises provided during therapy sessions    Assessment     Patient presents to PT today with R groin pain today with hip flexion activities, but is improving overall. Patient was progressed with TE and NMR today to address hip pain, tightness, and weakness. Patient did well from today without adverse effects.       Mago Is progressing well towards her goals.   Patient prognosis is Good.     Patient will continue to benefit from skilled outpatient physical therapy to address the deficits listed in the problem list box on initial evaluation, provide pt/family education and to maximize pt's level of independence in the home and community environment.     Patient's spiritual, cultural and educational needs considered and pt agreeable to plan of care and goals.     Anticipated barriers to physical therapy: Chronic symptoms    Goals:   Short Term Goals (3 Weeks):   1. Pt will be compliant with HEP to supplement PT in restoring pain free function.  2. Pt will improve impaired R LE strength to be 75% of the L leg  to improve strength for functional tasks  3. Pt improve impaired hip ROM by 5 deg in abnormal planes to improve mobility for normal movement patterns.   Long Term Goals (6 Weeks):  1. Pt will improve FOTO score to >/= 75 to decrease perceived limitation with mobility  2. Pt will improve impaired R LE strength to be >90% of the L leg  to improve strength for functional tasks  3. Pt improve impaired hip ROM to WNL in all planes to improve mobility for normal movement patterns.   4. Pt will have no pain with daily activities in the R hip to tolerate functional tasks without pain        PLAN   Plan of care Certification: 11/15/2024 to 1/15/2025.     Outpatient Physical Therapy 2 times weekly for 6 weeks to include the following interventions: Manual Therapy, Moist Heat/ Ice, Neuromuscular Re-ed, Patient  Education, Self Care, Therapeutic Activities, Therapeutic Exercise, and FDN.     Sergo Grubbs, PT, DPT

## 2024-12-13 ENCOUNTER — CLINICAL SUPPORT (OUTPATIENT)
Dept: REHABILITATION | Facility: HOSPITAL | Age: 64
End: 2024-12-13
Payer: MEDICARE

## 2024-12-13 DIAGNOSIS — R29.898 HIP TIGHTNESS: ICD-10-CM

## 2024-12-13 DIAGNOSIS — R29.898 WEAKNESS OF RIGHT LOWER EXTREMITY: Primary | ICD-10-CM

## 2024-12-13 PROCEDURE — 97110 THERAPEUTIC EXERCISES: CPT

## 2024-12-13 PROCEDURE — 97112 NEUROMUSCULAR REEDUCATION: CPT

## 2024-12-13 NOTE — PROGRESS NOTES
[facility]   Physical Therapy Treatment Note      Name: Mago Levi [facility] Number: [id number]    Therapy Diagnosis:   Encounter Diagnoses   Name Primary?    Weakness of right lower extremity Yes    Hip tightness          Physician: Misha Barajas MD    Visit Date: 12/13/2024    Physician Orders: PT Eval and Treat   Medical Diagnosis from Referral: R10.31 (ICD-10-CM) - Right groin pain   Evaluation Date: 11/15/2024  Authorization Period Expiration: TBD  Plan of Care Expiration: 1/15/2025  Progress Note Due: 12/15/2024  Visit # / Visits authorized: 1/1, 4/10  FOTO: 5/5     FOTO Initial Evaluation: 61  FOTO 2nd F/U: NA  FOTO Discharge: NA     Precautions: Standard      Time In: 1215  Time Out: 100   Total Appointment Time (timed & untimed codes): 45 minutes    PTA Visit #: 0/5       Subjective     Patient reports: Pt reported of feeling progression in her R. Hip, but only feeling pain when rolling in bed on her right side. Pt reported of feeling a muscle pull in her left leg but isn't causing any pain. Has been unable to do her HEPs because of feeling sick after trip [place]. She is able to get out of the car without pain in her right side. she is having less pain with getting in and out of the car in regard to her R hip/groin.       She was compliant with home exercise program.  Response to previous treatment: No adverse effects   Functional change: No change     Pain: 1/10  Location: right groin and upper thigh       Objective      Initial Eval       L/E Strength w/ MicroFET Muscle Alec Dynamometer Right Left Pain/Dysfunction with Movement   (approx 4 sec hold w/ max contraction)   Hip Flexion 8.7 kg  10.6 kg      Hip Abduction 6.5 kg  8.9 kg      Quadriceps 11.2 kg  17.4 kg      Hamstrings 5.3 kg  7.6 kg                  Hip Right Right Right Left Left Left Pain/Dysfunction with Movement     AROM PROM MMT AROM PROM MMT     Flexion WNL WNL 4/5 WNL WNL 4+/5     Extension 10 13  4/5 15 20 4+/5     Abduction WNL WNL 4/5 WNL WNL 4+/5     Adduction WNL WNL 4/5 WNL WNL 4+/5     Internal rotation 25 30 4/5 25 30 4+/5     External rotation 50 55 4/5 50 55 4+/5        Knee Right Right Left Left Pain/Dysfunction with Movement     AROM PROM AROM PROM     Flexion WNL WNL WNL WNL     Extension WNL WNL WNL WNL              Hip Flexibility:   - Shant Test: Tightness on the R (+)     FADIR (SOY): Negative      REBEKAH (impingement) : Negative           Treatment     Mago received the treatments listed below:      therapeutic exercises to develop strength, endurance, ROM, and flexibility for 30  minutes including:      Bike 7 min   Hip flexor stretching 30s x 4   Prone hip flexor stretch 30s x 4   SLR 2x10 (unable to complete)   Standing hip flexion w/ YTB 2x10   Standing marches 2x10  Hip REBEKAH Stretch 30s x 4       NT  SAQ 4# 3x10        manual therapy techniques: Joint mobilizations were applied to the: R hip for 0 minutes, including:    Hip traction (long axis)     neuromuscular re-education activities to improve: Coordination, Kinesthetic, Proprioception, Posture, and Motor Control for 15 minutes. The following activities were included:    TA Activation   - 15x for 3s   Bent knee fall out 2x10  - with TA   TA Activation w/ marching   TRX Squats (emphasis on control of posterior chain)  - 2x10       therapeutic activities to improve functional performance for 0  minutes, including:          Patient Education and Home Exercises       Education provided:   - [facility]      Access Code: [email or web address]  URL: [email or web address]  Date: 11/19/2024  Prepared by: Sergo Grubbs    Exercises  - Prone Hip Flexor Stretch on Table with Strap  - 1 x daily - 7 x weekly - 4 sets - 30 seconds hold  - Half Kneeling Hip Flexor Stretch  - 1 x daily - 7 x weekly - 4 sets - 30 seconds hold  - Bent Knee Fallouts with Alternating Legs  - 1 x daily - 7 x weekly - 2 sets - 10 reps  - Supine Figure 4 Piriformis Stretch  - 1 x  daily - 7 x weekly - 4 sets - 30 seconds hold  - Seated Long Arc Quad  - 1 x daily - 7 x weekly - 3 sets - 10 reps  - Active Straight Leg Raise with Quad Set  - 1 x daily - 7 x weekly - 3 sets - 10 reps    Written Home Exercises Provided: Yes. Exercises were reviewed and Mago was able to demonstrate them prior to the end of the session.  Mago demonstrated good  understanding of the education provided. See Electronic Medical Record under Patient Instructions for exercises provided during therapy sessions    Assessment     Patient continues to struggle with SLR so this was modified to standing hip flexion with YTB. Patient was progressed with NMR today and did well with reduced pain overall compared to last session. Patient did well from today without adverse effects.       Mago Is progressing well towards her goals.   Patient prognosis is Good.     Patient will continue to benefit from skilled outpatient physical therapy to address the deficits listed in the problem list box on initial evaluation, provide pt/family education and to maximize pt's level of independence in the home and community environment.     Patient's spiritual, cultural and educational needs considered and pt agreeable to plan of care and goals.     Anticipated barriers to physical therapy: Chronic symptoms    Goals:   Short Term Goals (3 Weeks):   1. Pt will be compliant with HEP to supplement PT in restoring pain free function.  2. Pt will improve impaired R LE strength to be 75% of the L leg  to improve strength for functional tasks  3. Pt improve impaired hip ROM by 5 deg in abnormal planes to improve mobility for normal movement patterns.   Long Term Goals (6 Weeks):  1. Pt will improve FOTO score to >/= 75 to decrease perceived limitation with mobility  2. Pt will improve impaired R LE strength to be >90% of the L leg  to improve strength for functional tasks  3. Pt improve impaired hip ROM to WNL in all planes to improve mobility  for normal movement patterns.   4. Pt will have no pain with daily activities in the R hip to tolerate functional tasks without pain        PLAN   Plan of care Certification: 11/15/2024 to 1/15/2025.     Outpatient Physical Therapy 2 times weekly for 6 weeks to include the following interventions: Manual Therapy, Moist Heat/ Ice, Neuromuscular Re-ed, Patient Education, Self Care, Therapeutic Activities, Therapeutic Exercise, and FDN.     Sergo Grubbs, PT, DPT

## 2024-12-17 ENCOUNTER — CLINICAL SUPPORT (OUTPATIENT)
Dept: REHABILITATION | Facility: HOSPITAL | Age: 64
End: 2024-12-17
Payer: MEDICARE

## 2024-12-17 DIAGNOSIS — R29.898 WEAKNESS OF RIGHT LOWER EXTREMITY: Primary | ICD-10-CM

## 2024-12-17 DIAGNOSIS — R29.898 HIP TIGHTNESS: ICD-10-CM

## 2024-12-17 PROCEDURE — 97112 NEUROMUSCULAR REEDUCATION: CPT

## 2024-12-17 PROCEDURE — 97110 THERAPEUTIC EXERCISES: CPT

## 2024-12-17 NOTE — PROGRESS NOTES
[facility]   Physical Therapy Treatment Note      Name: Mago Levi [facility] Number: [id number]    Therapy Diagnosis:   Encounter Diagnoses   Name Primary?    Weakness of right lower extremity Yes    Hip tightness          Physician: Misha Barajas MD    Visit Date: 12/17/2024    Physician Orders: PT Eval and Treat   Medical Diagnosis from Referral: R10.31 (ICD-10-CM) - Right groin pain   Evaluation Date: 11/15/2024  Authorization Period Expiration: 12/31/2024  Plan of Care Expiration: 1/15/2025  Progress Note Due: 12/15/2024  Visit # / Visits authorized: 1/1, 5/10  FOTO: 6     FOTO Initial Evaluation: 61  FOTO 2nd F/U: NA  FOTO Discharge: NA     Precautions: Standard      Time In: 230  Time Out: 315  Total Appointment Time (timed & untimed codes): 45 minutes    PTA Visit #: 0/5       Subjective     Patient reports:  Pt reported of feeling progression in her R. Hip, but only feeling pain when rolling in bed on her right side. Pt reported of feeling a muscle pull in her left leg but isn't causing any pain. Has been unable to do her HEPs because of feeling sick after trip overseas. She is able to get out of the car without pain in her right side. she is having less pain with getting in and out of the car in regard to her R hip/groin.       She was compliant with home exercise program.  Response to previous treatment: No adverse effects   Functional change: No change     Pain: 1/10  Location: right groin and upper thigh       Objective      Initial Eval       L/E Strength w/ MicroFET Muscle Alec Dynamometer Right Left Pain/Dysfunction with Movement   (approx 4 sec hold w/ max contraction)   Hip Flexion 8.7 kg  10.6 kg      Hip Abduction 6.5 kg  8.9 kg      Quadriceps 11.2 kg  17.4 kg      Hamstrings 5.3 kg  7.6 kg                  Hip Right Right Right Left Left Left Pain/Dysfunction with Movement     AROM PROM MMT AROM PROM MMT     Flexion WNL WNL 4/5 WNL WNL 4+/5     Extension 10  13 4/5 15 20 4+/5     Abduction WNL WNL 4/5 WNL WNL 4+/5     Adduction WNL WNL 4/5 WNL WNL 4+/5     Internal rotation 25 30 4/5 25 30 4+/5     External rotation 50 55 4/5 50 55 4+/5        Knee Right Right Left Left Pain/Dysfunction with Movement     AROM PROM AROM PROM     Flexion WNL WNL WNL WNL     Extension WNL WNL WNL WNL              Hip Flexibility:   - Shant Test: Tightness on the R (+)     FADIR (SOY): Negative      REBEKAH (impingement) : Negative           Treatment     Mago received the treatments listed below:      therapeutic exercises to develop strength, endurance, ROM, and flexibility for 30  minutes including:      Bike 7 min   Hip flexor stretching 30s x 4   Prone hip flexor stretch 30s x 4   Hip REBEKAH Stretch 30s x 4       SLR 2x10 (unable to complete)   Standing hip flexion w/ YTB 2x10   Standing marches 2x10        NT  SAQ 4# 3x10        manual therapy techniques: Joint mobilizations were applied to the: R hip for 0 minutes, including:    Hip traction (long axis)     neuromuscular re-education activities to improve: Coordination, Kinesthetic, Proprioception, Posture, and Motor Control for 15 minutes. The following activities were included:    TA Activation   - 15x for 3s   Bent knee fall out 2x10  - with TA   TA Activation w/ marching   TRX Squats (emphasis on control of posterior chain)  - 2x10       therapeutic activities to improve functional performance for 0  minutes, including:          Patient Education and Home Exercises       Education provided:   - [facility]      Access Code: [email or web address]  URL: [email or web address]  Date: 11/19/2024  Prepared by: Sergo Grubbs    Exercises  - Prone Hip Flexor Stretch on Table with Strap  - 1 x daily - 7 x weekly - 4 sets - 30 seconds hold  - Half Kneeling Hip Flexor Stretch  - 1 x daily - 7 x weekly - 4 sets - 30 seconds hold  - Bent Knee Fallouts with Alternating Legs  - 1 x daily - 7 x weekly - 2 sets - 10 reps  - Supine Figure 4 Piriformis Stretch   - 1 x daily - 7 x weekly - 4 sets - 30 seconds hold  - Seated Long Arc Quad  - 1 x daily - 7 x weekly - 3 sets - 10 reps  - Active Straight Leg Raise with Quad Set  - 1 x daily - 7 x weekly - 3 sets - 10 reps    Written Home Exercises Provided: Yes. Exercises were reviewed and Mago was able to demonstrate them prior to the end of the session.  Mago demonstrated good  understanding of the education provided. See Electronic Medical Record under Patient Instructions for exercises provided during therapy sessions    Assessment     Patient continues to struggle with SLR so this was modified to standing hip flexion with YTB. Patient was progressed with NMR today and did well with reduced pain overall compared to last session. Patient did well from today without adverse effects.       Mago Is progressing well towards her goals.   Patient prognosis is Good.     Patient will continue to benefit from skilled outpatient physical therapy to address the deficits listed in the problem list box on initial evaluation, provide pt/family education and to maximize pt's level of independence in the home and community environment.     Patient's spiritual, cultural and educational needs considered and pt agreeable to plan of care and goals.     Anticipated barriers to physical therapy: Chronic symptoms    Goals:   Short Term Goals (3 Weeks):   1. Pt will be compliant with HEP to supplement PT in restoring pain free function.  2. Pt will improve impaired R LE strength to be 75% of the L leg  to improve strength for functional tasks  3. Pt improve impaired hip ROM by 5 deg in abnormal planes to improve mobility for normal movement patterns.   Long Term Goals (6 Weeks):  1. Pt will improve FOTO score to >/= 75 to decrease perceived limitation with mobility  2. Pt will improve impaired R LE strength to be >90% of the L leg  to improve strength for functional tasks  3. Pt improve impaired hip ROM to WNL in all planes to improve  mobility for normal movement patterns.   4. Pt will have no pain with daily activities in the R hip to tolerate functional tasks without pain        PLAN   Plan of care Certification: 11/15/2024 to 1/15/2025.     Outpatient Physical Therapy 2 times weekly for 6 weeks to include the following interventions: Manual Therapy, Moist Heat/ Ice, Neuromuscular Re-ed, Patient Education, Self Care, Therapeutic Activities, Therapeutic Exercise, and FDN.     Sergo Grubbs, PT, DPT

## 2025-02-17 ENCOUNTER — CLINICAL SUPPORT (OUTPATIENT)
Dept: REHABILITATION | Facility: HOSPITAL | Age: 65
End: 2025-02-17
Payer: MEDICARE

## 2025-02-17 DIAGNOSIS — R29.898 HIP TIGHTNESS: ICD-10-CM

## 2025-02-17 DIAGNOSIS — R29.898 WEAKNESS OF RIGHT LOWER EXTREMITY: Primary | ICD-10-CM

## 2025-02-17 PROCEDURE — 97161 PT EVAL LOW COMPLEX 20 MIN: CPT | Mod: PO | Performed by: PHYSICAL THERAPIST

## 2025-02-17 PROCEDURE — 97112 NEUROMUSCULAR REEDUCATION: CPT | Mod: PO | Performed by: PHYSICAL THERAPIST

## 2025-02-17 NOTE — PROGRESS NOTES
Outpatient Rehab    Physical Therapy Evaluation    Patient Name: Mago Cleaning  MRN: [id number]  YOB: 1960  Today's Date: 2/17/2025    Therapy Diagnosis:   Encounter Diagnoses   Name Primary?    Weakness of right lower extremity Yes    Hip tightness      Physician: Shemar Ohara MD    Physician Orders: Eval and Treat  Medical Diagnosis:     M47.896 (ICD-10-CM) - Other osteoarthritis of spine, lumbar region     Visit # / Visits Authorized:  1 / 1   Date of Evaluation:  2/17/2025   Insurance Authorization Period: 02/04/2025 to 02/04/2026  Plan of Care Certification:  2/17/2025 to 4/14/2025      Time In: 1505   Time Out: 1605  Total Time: 60   Total Billable Time: 60    Intake Outcome Measure for FOTO Survey    Therapist reviewed FOTO scores for Mago Cleaning on 2/17/2025.   FOTO report - see Media section or FOTO account episode details.     Intake Score: 76%         Subjective   History of Present Illness  Mago is a 64 y.o. female who reports to physical therapy with a chief concern of R hip / groing pain and low back pain. According to the patient's chart, Mago has a past medical history of Arthritis. Mago has a past surgical history that includes Hysterectomy; Appendectomy; and Arthroplasty of hip by anterior approach (Right, 10/8/2021).    The patient reports a medical diagnosis of M47.896 (ICD-10-CM) - Other osteoarthritis of spine, lumbar region.    Diagnostic tests related to this condition: MRI studies.   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 She had R DANIEL in October of 2021 without complaint. She notes increased R  hip pain and low back pain following ab exercise series about 1.5 year ago. She had to halt this activity as pain worsened. Her primary complaint is R hip pain and weakness when lifting up her leg e.g. putting on pants or going up stairs.  MRIs demonstrate degenerative changes to L/S and no changes to R DANIEL. Since then pain has waxed and waned. Pain has been alleviated some with massage and water aerobic classes. She denies myelopathic symptoms or N/T into R LE.    Pain     Patient reports a current pain level of 0/10. Pain at best is reported as 0/10. Pain at worst is reported as 9/10.   Location: R hip and groin + Lower back  Clinical Progression (since onset): Stable  Pain Qualities: Aching, Knife-like, Discomfort  Pain-Relieving Factors: Activity modification, Massage, Other (Comment)  Other Pain-Relieving Factors: Water aerobics  Pain-Aggravating Factors: Lifting, Stair climbing           Past Medical History/Physical Systems Review:   Mago Cleaning  has a past medical history of Arthritis.    Mago Cleaning  has a past surgical history that includes Hysterectomy; Appendectomy; and Arthroplasty of hip by anterior approach (Right, 10/8/2021).    Mago has a current medication list which includes the following prescription(s): acyclovir, b complex vitamins, chromium picolinate, co-enzyme q-10, estradiol, estrogens(esterified)-methyltestosterone 0.625-1.25mg, glutamine, magnesium aspart,citrate,oxide, multivitamin, neomycin-polymyxin-dexamethasone, np thyroid, np thyroid, selenium, spironolactone, UNABLE TO FIND, UNABLE TO FIND, UNABLE TO FIND, valacyclovir, valacyclovir, and zinc gluconate.    Review of patient's allergies indicates:   Allergen Reactions    Venom-honey bee Swelling    Benadryl [diphenhydramine hcl] Itching        Objective      Knee Palpation  Right Knee Palpation  Abnormal: Muscle     Spasm + TrP of R quad > adductors                 Hip Range of Motion   Right Hip   Active  (deg) Passive (deg) Pain   Flexion  (Painful and limited) 130 Yes   Extension         ABduction         ADduction         External Rotation 90/90   45     External Rotation Prone         Internal Rotation 90/90         Internal Rotation Prone             Left Hip   Active (deg) Passive (deg) Pain   Flexion   130     Extension         ABduction         ADduction         External Rotation 90/90   70 (WNL)     External Rotation Prone         Internal Rotation 90/90         Internal Rotation Prone                            Hip Strength - Planes of Motion   Right Strength Right Pain Left Strength Left  Pain   Flexion (L2) 3- Yes 3+     Extension 3-   3+     ABduction 3   3-     ADduction 4   4-     Internal Rotation 4   4-     External Rotation 3- Yes 3         Knee Strength   Right Strength Right Pain Left Strength Left  Pain   Flexion (S2) 3+   3+     Prone Flexion           Extension (L3) 4 Yes 4-                Lumbar/Pelvic Girdle Special Tests  Pelvic Girdle / Sacrum Tests  Negative: Right Sacroiliac Compression and Left Sacroiliac Compression  Negative: Right Thigh Thrust/Posterior Shear and Left Thigh Thrust/Posterior Shear         Hip Special Tests  Flex/Imbalance/Structural Tests  Negative: Right Ely's and Left Ely's  Sacroiliac Joint Tests  Negative: Right Sacral Thrust, Left Sacral Thrust, Right Compression, Left Compression, Right Distraction, Left Distraction, Right Thigh Thrust/Posterior Shear, and Left Thigh Thrust/Posterior Shear  90/90 Hamstring Test  Negative: Right and Left     SLR SIJ instability + on R side    Knee Special Tests  Knee Flexibility Tests  Negative: Right Ely's and Left Ely's                  Hip Joint Mobility  Right Hip Joint Mobility  Hypomobile: Posterior Capsule and Lateral Capsule              Treatment:  Balance/Neuromuscular Re-Education  Balance/Neuromuscular Re-Education Activity 1: Patient education: diagnosis, prognosis, importance; muscle imbalances; joint capsule  hypomobility; HEP  Balance/Neuromuscular Re-Education Activity 2: Bridges 5x10 sec ea; normal, adducted, abducted - cuing glutes all  Balance/Neuromuscular Re-Education Activity 3: Clam hold 5x10 sec ea ebony    NEXT VISIT:  - DN to quad  - Cont posterior chain NM activation + stability    FUTURE VISITS:  - initiate core NM activation + ER stretching    Assessment & Plan   Assessment  Mago presents with a condition of Low complexity.   Presentation of Symptoms: Stable       Functional Limitations: Activity tolerance, Functional mobility, Completing self-care activities, Pain with ADLs/IADLs, Painful locomotion/ambulation  Impairments: Abnormal muscle firing, Abnormal muscle tone, Abnormal or restricted range of motion, Activity intolerance, Impaired physical strength, Lack of appropriate home exercise program, Pain with functional activity    Patient Goal for Therapy (PT): ADLs and gym w/o pain or complaint  Prognosis: Good  Assessment Details: Mago is a 64 y.o. female referred to outpatient Physical Therapy with a medical diagnosis of M47.896 (ICD-10-CM) - Other osteoarthritis of spine, lumbar region. Patient presents with low back pain, R hip and groin pain, + TrP of R quads, joint hypomobility of inferior and lateral capsule on R hip, ROM deficits of R hip candace into ER, + SIJ instability testing, and bilateral NM activation and LE strength deficits. Impairments contributory to pain affecting ADLs candace with hip flexion e.g. putting on pants / going up stairs.    Plan  From a physical therapy perspective, the patient would benefit from: Skilled Rehab Services    Planned therapy interventions include: Therapeutic exercise, Therapeutic activities, Neuromuscular re-education, Manual therapy, ADLs/IADLs, and Other (Comment). Dry needling  Planned modalities to include: Cryotherapy (cold pack) and Thermotherapy (hot pack).        Visit Frequency: 1 times Per Week for 8 Weeks.       This plan was discussed with  Patient.   Discussion participants: Agreed Upon Plan of Care             Patient's spiritual, cultural, and educational needs considered and patient agreeable to plan of care and goals.           Goals:   Active       Functional outcome       Patient will show a significant change in FOTO patient-reported outcome tool to demonstrate subjective improvement       Start:  02/17/25    Expected End:  04/14/25            Patient stated goal: ADLs and gym w/o pain or complaint        Start:  02/17/25    Expected End:  04/14/25            Patient will demonstrate independence in home program for support of progression       Start:  02/17/25    Expected End:  04/14/25               Pain       Patient will report a 2 point reduction in pain while performing hip flexion       Start:  02/17/25    Expected End:  04/14/25               Range of Motion       Patient will achieve bilateral hip external rotation ROM 55 degrees in 90 degrees hip flexion       Start:  02/17/25    Expected End:  04/14/25               Strength       Patient will achieve bilateral hip flexion strength of 4/5       Start:  02/17/25    Expected End:  04/14/25            Patient will achieve bilateral hip extension strength of 4/5       Start:  02/17/25    Expected End:  04/14/25                LORETO MARTINEZ, PT, DPT, OCS

## 2025-02-17 NOTE — LETTER
February 17, 2025  Misha Barajas MD  [address] [place] [address] [facility] [place] [address]    To whom it may concern,     The attached plan of care is being sent to you for review and reference.    You may indicate your approval by signing the document electronically, or by faxing/mailing a signed copy of the final page of this document back to the attention of ANISH FAUSTIN PT, DPT:         Plan of Care 2/17/25   Effective from: 2/17/2025  Effective to: 4/14/2025    Plan ID: [id number]            Participants as of Finalize on 2/17/2025    Name Type Comments Contact Info    Misha Barajas MD Referring Provider  [phone]    Anish Faustin PT, DPT Physical Therapist         Last Plan Note     Author: Anish Faustin PT, DPT Status: Incomplete Last edited: 2/17/2025  3:00 PM         Outpatient Rehab    Physical Therapy Evaluation    Patient Name: Mago Cleaning  MRN: [id number]  YOB: 1960  Today's Date: 2/17/2025    Therapy Diagnosis:   Encounter Diagnoses   Name Primary?    Weakness of right lower extremity Yes    Hip tightness      Physician: Shemar Ohraa MD    Physician Orders: Eval and Treat  Medical Diagnosis:     M47.896 (ICD-10-CM) - Other osteoarthritis of spine, lumbar region     Visit # / Visits Authorized:  1 / 1   Date of Evaluation:  2/17/2025   Insurance Authorization Period: 02/04/2025 to 02/04/2026  Plan of Care Certification:  2/17/2025 to 4/14/2025      Time In: 1505   Time Out: 1605  Total Time: 60   Total Billable Time: 60    Intake Outcome Measure for FOTO Survey    Therapist reviewed FOTO scores for Mago Cleaning on 2/17/2025.   FOTO report - see Media section or FOTO account episode details.     Intake Score: 76%         Subjective  History of Present Illness  Mago is a 64 y.o. female who reports to physical therapy with a chief concern of R hip / groing pain and low back pain. According to the patient's chart, Mago has a past medical  history of Arthritis. Mago has a past surgical history that includes Hysterectomy; Appendectomy; and Arthroplasty of hip by anterior approach (Right, 10/8/2021).    The patient reports a medical diagnosis of M47.896 (ICD-10-CM) - Other osteoarthritis of spine, lumbar region.    Diagnostic tests related to this condition: MRI studies.   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 She had R DANIEL in October of 2021 without complaint. She notes increased R hip pain and low back pain following ab exercise series about 1.5 year ago. She had to halt this activity as pain worsened. Her primary complaint is R hip pain and weakness when lifting up her leg e.g. putting on pants or going up stairs.  MRIs demonstrate degenerative changes to L/S and no changes to R DANIEL. Since then pain has waxed and waned. Pain has been alleviated some with massage and water aerobic classes. She denies myelopathic symptoms or N/T into R LE.    Pain     Patient reports a current pain level of 0/10. Pain at best is reported as 0/10. Pain at worst is reported as 9/10.   Location: R hip and groin + Lower back  Clinical Progression (since onset): Stable  Pain Qualities: Aching, Knife-like, Discomfort  Pain-Relieving Factors: Activity modification, Massage, Other (Comment)  Other Pain-Relieving Factors: Water aerobics  Pain-Aggravating Factors: Lifting, Stair climbing           Past Medical History/Physical Systems Review:   Mago Cleaning  has a past medical history of Arthritis.    Mago Cleaning  has a past surgical history that includes Hysterectomy; Appendectomy; and Arthroplasty of hip by  anterior approach (Right, 10/8/2021).    Mago has a current medication list which includes the following prescription(s): acyclovir, b complex vitamins, chromium picolinate, co-enzyme q-10, estradiol, estrogens(esterified)-methyltestosterone 0.625-1.25mg, glutamine, magnesium aspart,citrate,oxide, multivitamin, neomycin-polymyxin-dexamethasone, np thyroid, np thyroid, selenium, spironolactone, UNABLE TO FIND, UNABLE TO FIND, UNABLE TO FIND, valacyclovir, valacyclovir, and zinc gluconate.    Review of patient's allergies indicates:   Allergen Reactions    Venom-honey bee Swelling    Benadryl [diphenhydramine hcl] Itching        Objective     Knee Palpation  Right Knee Palpation  Abnormal: Muscle     Spasm + TrP of R quad > adductors                 Hip Range of Motion   Right Hip   Active (deg) Passive (deg) Pain   Flexion  (Painful and limited) 130 Yes   Extension         ABduction         ADduction         External Rotation 90/90   45     External Rotation Prone         Internal Rotation 90/90         Internal Rotation Prone             Left Hip   Active (deg) Passive (deg) Pain   Flexion   130     Extension         ABduction         ADduction         External Rotation 90/90   70 (WNL)     External Rotation Prone         Internal Rotation 90/90         Internal Rotation Prone                            Hip Strength - Planes of Motion   Right Strength Right Pain Left Strength Left  Pain   Flexion (L2) 3- Yes 3+     Extension 3-   3+     ABduction 3   3-     ADduction 4   4-     Internal Rotation 4   4-     External Rotation 3- Yes 3         Knee Strength   Right Strength Right Pain Left Strength Left  Pain   Flexion (S2) 3+   3+     Prone Flexion           Extension (L3) 4 Yes 4-                Lumbar/Pelvic Girdle Special Tests  Pelvic Girdle / Sacrum Tests  Negative: Right Sacroiliac Compression and Left Sacroiliac Compression  Negative: Right Thigh Thrust/Posterior Shear and Left Thigh Thrust/Posterior  Shear         Hip Special Tests  Flex/Imbalance/Structural Tests  Negative: Right Ely's and Left Ely's  Sacroiliac Joint Tests  Negative: Right Sacral Thrust, Left Sacral Thrust, Right Compression, Left Compression, Right Distraction, Left Distraction, Right Thigh Thrust/Posterior Shear, and Left Thigh Thrust/Posterior Shear  90/90 Hamstring Test  Negative: Right and Left     SLR SIJ instability + on R side    Knee Special Tests  Knee Flexibility Tests  Negative: Right Ely's and Left Ely's                  Hip Joint Mobility  Right Hip Joint Mobility  Hypomobile: Posterior Capsule and Lateral Capsule              Treatment:  Balance/Neuromuscular Re-Education  Balance/Neuromuscular Re-Education Activity 1: Patient education: diagnosis, prognosis, importance; muscle imbalances; joint capsule hypomobility; HEP  Balance/Neuromuscular Re-Education Activity 2: Bridges 5x10 sec ea; normal, adducted, abducted - cuing glutes all  Balance/Neuromuscular Re-Education Activity 3: Clam hold 5x10 sec ea ebony    NEXT VISIT:  - DN to quad  - Cont posterior chain NM activation + stability    FUTURE VISITS:  - initiate core NM activation + ER stretching    Assessment & Plan  Assessment  Mago presents with a condition of Low complexity.   Presentation of Symptoms: Stable       Functional Limitations: Activity tolerance, Functional mobility, Completing self-care activities, Pain with ADLs/IADLs, Painful locomotion/ambulation  Impairments: Abnormal muscle firing, Abnormal muscle tone, Abnormal or restricted range of motion, Activity intolerance, Impaired physical strength, Lack of appropriate home exercise program, Pain with functional activity    Patient Goal for Therapy (PT): ADLs and gym w/o pain or complaint  Prognosis: Good  Assessment Details: Mago is a 64 y.o. female referred to outpatient Physical Therapy with a medical diagnosis of M47.896 (ICD-10-CM) - Other osteoarthritis of spine, lumbar region. Patient presents with  low back pain, R hip and groin pain, + TrP of R quads, joint hypomobility of inferior and lateral capsule on R hip, ROM deficits of R hip candace into ER, + SIJ instability testing, and bilateral NM activation and LE strength deficits. Impairments contributory to pain affecting ADLs candace with hip flexion e.g. putting on pants / going up stairs.    Plan  From a physical therapy perspective, the patient would benefit from: Skilled Rehab Services    Planned therapy interventions include: Therapeutic exercise, Therapeutic activities, Neuromuscular re-education, Manual therapy, ADLs/IADLs, and Other (Comment). Dry needling  Planned modalities to include: Cryotherapy (cold pack) and Thermotherapy (hot pack).        Visit Frequency: 1 times Per Week for 8 Weeks.       This plan was discussed with Patient.   Discussion participants: Agreed Upon Plan of Care             Patient's spiritual, cultural, and educational needs considered and patient agreeable to plan of care and goals.           Goals:   Active       Functional outcome       Patient will show a significant change in FOTO patient-reported outcome tool to demonstrate subjective improvement       Start:  02/17/25    Expected End:  04/14/25            Patient stated goal: ADLs and gym w/o pain or complaint        Start:  02/17/25    Expected End:  04/14/25            Patient will demonstrate independence in home program for support of progression       Start:  02/17/25    Expected End:  04/14/25               Pain       Patient will report a 2 point reduction in pain while performing hip flexion       Start:  02/17/25    Expected End:  04/14/25               Range of Motion       Patient will achieve bilateral hip external rotation ROM 55 degrees in 90 degrees hip flexion       Start:  02/17/25    Expected End:  04/14/25               Strength       Patient will achieve bilateral hip flexion strength of 4/5       Start:  02/17/25    Expected End:  04/14/25             Patient will achieve bilateral hip extension strength of 4/5       Start:  02/17/25    Expected End:  04/14/25                ANISH MARTINEZ PT, DPT, OCS           Current Participants as of 2/17/2025    Name Type Comments Contact Info    Misha Barajas MD Referring Provider  [phone]    Signature pending    Anish Martinez PT, DPT Physical Therapist                  Sincerely,      ANISH MARTINEZ PT, DPT  [facility]                                                            Dear ANISH MARTINEZ PT, AKANKSHAT,    RE: Ms. Mago Cleaning, MRN: [id number]    I certify that I have reviewed the attached plan of care and agree to the details within.        ___________________________  ___________________________  Patient Printed Name    Patient Signed Name      ___________________________  Date and Time

## 2025-02-17 NOTE — Clinical Note
February 17, 2025  Misha Barajas MD  [address] [place] [address] [facility] [place] [address]    To whom it may concern,     The attached plan of care is being sent to you for review and reference.    You may indicate your approval by signing the document electronically, or by faxing/mailing a signed copy of the final page of this document back to the attention of ANISH FAUSTIN PT, DPT:         Plan of Care 2/17/25   Effective from: 2/17/2025  Effective to: 4/14/2025    Plan ID: [id number]            Participants as of Finalize on 2/17/2025    Name Type Comments Contact Info    Misha Barajas MD Referring Provider  [phone]    Shemar Ohara MD Consulting Physician  [phone]    Anish aFustin PT, DPT Physical Therapist         Last Plan Note     Author: Anish Faustin PT, DPT Status: Sign when Signing Visit Last edited: 2/17/2025  3:00 PM         Outpatient Rehab    Physical Therapy Evaluation    Patient Name: Mago Cleaning  MRN: [id number]  YOB: 1960  Today's Date: 2/17/2025    Therapy Diagnosis:   Encounter Diagnoses   Name Primary?    Weakness of right lower extremity Yes    Hip tightness      Physician: Shemar Ohara MD    Physician Orders: Eval and Treat  Medical Diagnosis:     M47.896 (ICD-10-CM) - Other osteoarthritis of spine, lumbar region     Visit # / Visits Authorized:  1 / 1   Date of Evaluation:  2/17/2025   Insurance Authorization Period: 02/04/2025 to 02/04/2026  Plan of Care Certification:  2/17/2025 to 4/14/2025      Time In: 1505   Time Out: 1605  Total Time: 60   Total Billable Time: 60    Intake Outcome Measure for FOTO Survey    Therapist reviewed FOTO scores for Mago Cleaning on 2/17/2025.   FOTO report - see Media section or FOTO account episode details.     Intake Score: 76%         Subjective   History of Present Illness  Mago is a 64 y.o. female who reports to physical therapy with a chief concern of R hip / groing pain and low back  pain. According to the patient's chart, Mago has a past medical history of Arthritis. Mago has a past surgical history that includes Hysterectomy; Appendectomy; and Arthroplasty of hip by anterior approach (Right, 10/8/2021).    The patient reports a medical diagnosis of M47.896 (ICD-10-CM) - Other osteoarthritis of spine, lumbar region.    Diagnostic tests related to this condition: MRI studies.   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 She had R DANIEL in October of 2021 without complaint. She notes increased R hip pain and low back pain following ab exercise series about 1.5 year ago. She had to halt this activity as pain worsened. Her primary complaint is R hip pain and weakness when lifting up her leg e.g. putting on pants or going up stairs.  MRIs demonstrate degenerative changes to L/S and no changes to R DANIEL. Since then pain has waxed and waned. Pain has been alleviated some with massage and water aerobic classes. She denies myelopathic symptoms or N/T into R LE.    Pain     Patient reports a current pain level of 0/10. Pain at best is reported as 0/10. Pain at worst is reported as 9/10.   Location: R hip and groin + Lower back  Clinical Progression (since onset): Stable  Pain Qualities: Aching, Knife-like, Discomfort  Pain-Relieving Factors: Activity modification, Massage, Other (Comment)  Other Pain-Relieving Factors: Water aerobics  Pain-Aggravating Factors: Lifting, Stair climbing           Past Medical History/Physical Systems Review:   Mago Cleaning  has a past medical history of Arthritis.    Mago Cleaning  has a past surgical history  that includes Hysterectomy; Appendectomy; and Arthroplasty of hip by anterior approach (Right, 10/8/2021).    Mago has a current medication list which includes the following prescription(s): acyclovir, b complex vitamins, chromium picolinate, co-enzyme q-10, estradiol, estrogens(esterified)-methyltestosterone 0.625-1.25mg, glutamine, magnesium aspart,citrate,oxide, multivitamin, neomycin-polymyxin-dexamethasone, np thyroid, np thyroid, selenium, spironolactone, UNABLE TO FIND, UNABLE TO FIND, UNABLE TO FIND, valacyclovir, valacyclovir, and zinc gluconate.    Review of patient's allergies indicates:   Allergen Reactions    Venom-honey bee Swelling    Benadryl [diphenhydramine hcl] Itching        Objective      Knee Palpation  Right Knee Palpation  Abnormal: Muscle     Spasm + TrP of R quad > adductors                 Hip Range of Motion   Right Hip   Active (deg) Passive (deg) Pain   Flexion  (Painful and limited) 130 Yes   Extension         ABduction         ADduction         External Rotation 90/90   45     External Rotation Prone         Internal Rotation 90/90         Internal Rotation Prone             Left Hip   Active (deg) Passive (deg) Pain   Flexion   130     Extension         ABduction         ADduction         External Rotation 90/90   70 (WNL)     External Rotation Prone         Internal Rotation 90/90         Internal Rotation Prone                            Hip Strength - Planes of Motion   Right Strength Right Pain Left Strength Left  Pain   Flexion (L2) 3- Yes 3+     Extension 3-   3+     ABduction 3   3-     ADduction 4   4-     Internal Rotation 4   4-     External Rotation 3- Yes 3         Knee Strength   Right Strength Right Pain Left Strength Left  Pain   Flexion (S2) 3+   3+     Prone Flexion           Extension (L3) 4 Yes 4-                Lumbar/Pelvic Girdle Special Tests  Pelvic Girdle / Sacrum Tests  Negative: Right Sacroiliac Compression and Left Sacroiliac Compression  Negative:  Right Thigh Thrust/Posterior Shear and Left Thigh Thrust/Posterior Shear         Hip Special Tests  Flex/Imbalance/Structural Tests  Negative: Right Ely's and Left Ely's  Sacroiliac Joint Tests  Negative: Right Sacral Thrust, Left Sacral Thrust, Right Compression, Left Compression, Right Distraction, Left Distraction, Right Thigh Thrust/Posterior Shear, and Left Thigh Thrust/Posterior Shear  90/90 Hamstring Test  Negative: Right and Left     SLR SIJ instability + on R side    Knee Special Tests  Knee Flexibility Tests  Negative: Right Ely's and Left Ely's                  Hip Joint Mobility  Right Hip Joint Mobility  Hypomobile: Posterior Capsule and Lateral Capsule              Treatment:  Balance/Neuromuscular Re-Education  Balance/Neuromuscular Re-Education Activity 1: Patient education: diagnosis, prognosis, importance; muscle imbalances; joint capsule hypomobility; HEP  Balance/Neuromuscular Re-Education Activity 2: Bridges 5x10 sec ea; normal, adducted, abducted - cuing glutes all  Balance/Neuromuscular Re-Education Activity 3: Clam hold 5x10 sec ea ebony    NEXT VISIT:  - DN to quad  - Cont posterior chain NM activation + stability    FUTURE VISITS:  - initiate core NM activation + ER stretching    Assessment & Plan   Assessment  Mago presents with a condition of Low complexity.   Presentation of Symptoms: Stable       Functional Limitations: Activity tolerance, Functional mobility, Completing self-care activities, Pain with ADLs/IADLs, Painful locomotion/ambulation  Impairments: Abnormal muscle firing, Abnormal muscle tone, Abnormal or restricted range of motion, Activity intolerance, Impaired physical strength, Lack of appropriate home exercise program, Pain with functional activity    Patient Goal for Therapy (PT): ADLs and gym w/o pain or complaint  Prognosis: Good  Assessment Details: Mago is a 64 y.o. female referred to outpatient Physical Therapy with a medical diagnosis of M47.896 (ICD-10-CM) -  Other osteoarthritis of spine, lumbar region. Patient presents with low back pain, R hip and groin pain, + TrP of R quads, joint hypomobility of inferior and lateral capsule on R hip, ROM deficits of R hip candace into ER, + SIJ instability testing, and bilateral NM activation and LE strength deficits. Impairments contributory to pain affecting ADLs candace with hip flexion e.g. putting on pants / going up stairs.    Plan  From a physical therapy perspective, the patient would benefit from: Skilled Rehab Services    Planned therapy interventions include: Therapeutic exercise, Therapeutic activities, Neuromuscular re-education, Manual therapy, ADLs/IADLs, and Other (Comment). Dry needling  Planned modalities to include: Cryotherapy (cold pack) and Thermotherapy (hot pack).        Visit Frequency: 1 times Per Week for 8 Weeks.       This plan was discussed with Patient.   Discussion participants: Agreed Upon Plan of Care             Patient's spiritual, cultural, and educational needs considered and patient agreeable to plan of care and goals.           Goals:   Active       Functional outcome       Patient will show a significant change in FOTO patient-reported outcome tool to demonstrate subjective improvement       Start:  02/17/25    Expected End:  04/14/25            Patient stated goal: ADLs and gym w/o pain or complaint        Start:  02/17/25    Expected End:  04/14/25            Patient will demonstrate independence in home program for support of progression       Start:  02/17/25    Expected End:  04/14/25               Pain       Patient will report a 2 point reduction in pain while performing hip flexion       Start:  02/17/25    Expected End:  04/14/25               Range of Motion       Patient will achieve bilateral hip external rotation ROM 55 degrees in 90 degrees hip flexion       Start:  02/17/25    Expected End:  04/14/25               Strength       Patient will achieve bilateral hip flexion strength of  4/5       Start:  02/17/25    Expected End:  04/14/25            Patient will achieve bilateral hip extension strength of 4/5       Start:  02/17/25    Expected End:  04/14/25                ANISH FAUSTIN PT, DPT, OCS           Current Participants as of 2/17/2025    Name Type Comments Contact Info    Misha Barajas MD Referring Provider  [phone]    Signature pending    Shemar Ohara MD Consulting Physician  [phone]    Signature pending    Anish Faustin PT, DPT Physical Therapist                  Sincerely,      ANISH FAUSTIN PT, DPT  [facility]                                                            Dear ANISH FAUSTIN PT, DPT,    RE: Ms. Mago Clenaing, MRN: [id number]    I certify that I have reviewed the attached plan of care and agree to the details within.        ___________________________  ___________________________  Patient Printed Name    Patient Signed Name      ___________________________  Date and Time

## 2025-02-17 NOTE — LETTER
February 17, 2025  Shemar Ohara MD  [address] [place] [address]    To whom it may concern,     The attached plan of care is being sent to you for review and reference.    You may indicate your approval by signing the document electronically, or by faxing/mailing a signed copy of the final page of this document back to the attention of ANISH FAUSTIN PT, DPT:         Plan of Care 2/17/25   Effective from: 2/17/2025  Effective to: 4/14/2025    Plan ID: [id number]            Participants as of Finalize on 2/17/2025    Name Type Comments Contact Info    Misha Barajas MD Referring Provider  [phone]    Shemar Ohara MD Consulting Physician  [phone]    Anish Faustin PT, DPT Physical Therapist         Last Plan Note     Author: Anish Faustin PT, DPT Status: Sign when Signing Visit Last edited: 2/17/2025  3:00 PM         Outpatient Rehab    Physical Therapy Evaluation    Patient Name: Mago Cleaning  MRN: [id number]  YOB: 1960  Today's Date: 2/17/2025    Therapy Diagnosis:   Encounter Diagnoses   Name Primary?    Weakness of right lower extremity Yes    Hip tightness      Physician: Shemar Ohara MD    Physician Orders: Eval and Treat  Medical Diagnosis:     M47.896 (ICD-10-CM) - Other osteoarthritis of spine, lumbar region     Visit # / Visits Authorized:  1 / 1   Date of Evaluation:  2/17/2025   Insurance Authorization Period: 02/04/2025 to 02/04/2026  Plan of Care Certification:  2/17/2025 to 4/14/2025      Time In: 1505   Time Out: 1605  Total Time: 60   Total Billable Time: 60    Intake Outcome Measure for FOTO Survey    Therapist reviewed FOTO scores for Mago Cleaning on 2/17/2025.   FOTO report - see Media section or FOTO account episode details.     Intake Score: 76%         Subjective   History of Present Illness  Mago is a 64 y.o. female who reports to physical therapy with a chief concern of R hip / groing pain  and low back pain. According to the patient's chart, Mago has a past medical history of Arthritis. Mago has a past surgical history that includes Hysterectomy; Appendectomy; and Arthroplasty of hip by anterior approach (Right, 10/8/2021).    The patient reports a medical diagnosis of M47.896 (ICD-10-CM) - Other osteoarthritis of spine, lumbar region.    Diagnostic tests related to this condition: MRI studies.   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 She had R DANIEL in October of 2021 without complaint. She notes increased R hip pain and low back pain following ab exercise series about 1.5 year ago. She had to halt this activity as pain worsened. Her primary complaint is R hip pain and weakness when lifting up her leg e.g. putting on pants or going up stairs.  MRIs demonstrate degenerative changes to L/S and no changes to R DANIEL. Since then pain has waxed and waned. Pain has been alleviated some with massage and water aerobic classes. She denies myelopathic symptoms or N/T into R LE.    Pain     Patient reports a current pain level of 0/10. Pain at best is reported as 0/10. Pain at worst is reported as 9/10.   Location: R hip and groin + Lower back  Clinical Progression (since onset): Stable  Pain Qualities: Aching, Knife-like, Discomfort  Pain-Relieving Factors: Activity modification, Massage, Other (Comment)  Other Pain-Relieving Factors: Water aerobics  Pain-Aggravating Factors: Lifting, Stair climbing           Past Medical History/Physical Systems Review:   Mago Cleaning  has a past medical history of Arthritis.    Mago Cleaning  has a past  surgical history that includes Hysterectomy; Appendectomy; and Arthroplasty of hip by anterior approach (Right, 10/8/2021).    Mago has a current medication list which includes the following prescription(s): acyclovir, b complex vitamins, chromium picolinate, co-enzyme q-10, estradiol, estrogens(esterified)-methyltestosterone 0.625-1.25mg, glutamine, magnesium aspart,citrate,oxide, multivitamin, neomycin-polymyxin-dexamethasone, np thyroid, np thyroid, selenium, spironolactone, UNABLE TO FIND, UNABLE TO FIND, UNABLE TO FIND, valacyclovir, valacyclovir, and zinc gluconate.    Review of patient's allergies indicates:   Allergen Reactions    Venom-honey bee Swelling    Benadryl [diphenhydramine hcl] Itching        Objective      Knee Palpation  Right Knee Palpation  Abnormal: Muscle     Spasm + TrP of R quad > adductors                 Hip Range of Motion   Right Hip   Active (deg) Passive (deg) Pain   Flexion  (Painful and limited) 130 Yes   Extension         ABduction         ADduction         External Rotation 90/90   45     External Rotation Prone         Internal Rotation 90/90         Internal Rotation Prone             Left Hip   Active (deg) Passive (deg) Pain   Flexion   130     Extension         ABduction         ADduction         External Rotation 90/90   70 (WNL)     External Rotation Prone         Internal Rotation 90/90         Internal Rotation Prone                            Hip Strength - Planes of Motion   Right Strength Right Pain Left Strength Left  Pain   Flexion (L2) 3- Yes 3+     Extension 3-   3+     ABduction 3   3-     ADduction 4   4-     Internal Rotation 4   4-     External Rotation 3- Yes 3         Knee Strength   Right Strength Right Pain Left Strength Left  Pain   Flexion (S2) 3+   3+     Prone Flexion           Extension (L3) 4 Yes 4-                Lumbar/Pelvic Girdle Special Tests  Pelvic Girdle / Sacrum Tests  Negative: Right Sacroiliac Compression and Left Sacroiliac  Compression  Negative: Right Thigh Thrust/Posterior Shear and Left Thigh Thrust/Posterior Shear         Hip Special Tests  Flex/Imbalance/Structural Tests  Negative: Right Ely's and Left Ely's  Sacroiliac Joint Tests  Negative: Right Sacral Thrust, Left Sacral Thrust, Right Compression, Left Compression, Right Distraction, Left Distraction, Right Thigh Thrust/Posterior Shear, and Left Thigh Thrust/Posterior Shear  90/90 Hamstring Test  Negative: Right and Left     SLR SIJ instability + on R side    Knee Special Tests  Knee Flexibility Tests  Negative: Right Ely's and Left Ely's                  Hip Joint Mobility  Right Hip Joint Mobility  Hypomobile: Posterior Capsule and Lateral Capsule              Treatment:  Balance/Neuromuscular Re-Education  Balance/Neuromuscular Re-Education Activity 1: Patient education: diagnosis, prognosis, importance; muscle imbalances; joint capsule hypomobility; HEP  Balance/Neuromuscular Re-Education Activity 2: Bridges 5x10 sec ea; normal, adducted, abducted - cuing glutes all  Balance/Neuromuscular Re-Education Activity 3: Clam hold 5x10 sec ea ebony    NEXT VISIT:  - DN to quad  - Cont posterior chain NM activation + stability    FUTURE VISITS:  - initiate core NM activation + ER stretching    Assessment & Plan   Assessment  Mago presents with a condition of Low complexity.   Presentation of Symptoms: Stable       Functional Limitations: Activity tolerance, Functional mobility, Completing self-care activities, Pain with ADLs/IADLs, Painful locomotion/ambulation  Impairments: Abnormal muscle firing, Abnormal muscle tone, Abnormal or restricted range of motion, Activity intolerance, Impaired physical strength, Lack of appropriate home exercise program, Pain with functional activity    Patient Goal for Therapy (PT): ADLs and gym w/o pain or complaint  Prognosis: Good  Assessment Details: Mago is a 64 y.o. female referred to outpatient Physical Therapy with a medical diagnosis of  M47.896 (ICD-10-CM) - Other osteoarthritis of spine, lumbar region. Patient presents with low back pain, R hip and groin pain, + TrP of R quads, joint hypomobility of inferior and lateral capsule on R hip, ROM deficits of R hip candace into ER, + SIJ instability testing, and bilateral NM activation and LE strength deficits. Impairments contributory to pain affecting ADLs candace with hip flexion e.g. putting on pants / going up stairs.    Plan  From a physical therapy perspective, the patient would benefit from: Skilled Rehab Services    Planned therapy interventions include: Therapeutic exercise, Therapeutic activities, Neuromuscular re-education, Manual therapy, ADLs/IADLs, and Other (Comment). Dry needling  Planned modalities to include: Cryotherapy (cold pack) and Thermotherapy (hot pack).        Visit Frequency: 1 times Per Week for 8 Weeks.       This plan was discussed with Patient.   Discussion participants: Agreed Upon Plan of Care             Patient's spiritual, cultural, and educational needs considered and patient agreeable to plan of care and goals.           Goals:   Active       Functional outcome       Patient will show a significant change in FOTO patient-reported outcome tool to demonstrate subjective improvement       Start:  02/17/25    Expected End:  04/14/25            Patient stated goal: ADLs and gym w/o pain or complaint        Start:  02/17/25    Expected End:  04/14/25            Patient will demonstrate independence in home program for support of progression       Start:  02/17/25    Expected End:  04/14/25               Pain       Patient will report a 2 point reduction in pain while performing hip flexion       Start:  02/17/25    Expected End:  04/14/25               Range of Motion       Patient will achieve bilateral hip external rotation ROM 55 degrees in 90 degrees hip flexion       Start:  02/17/25    Expected End:  04/14/25               Strength       Patient will achieve bilateral hip  flexion strength of 4/5       Start:  02/17/25    Expected End:  04/14/25            Patient will achieve bilateral hip extension strength of 4/5       Start:  02/17/25    Expected End:  04/14/25                ANISH FAUSTIN PT, DPT, OCS           Current Participants as of 2/17/2025    Name Type Comments Contact Info    Misha Barajas MD Referring Provider  [phone]    Signature pending    Shemar Ohara MD Consulting Physician  [phone]    Signature pending    Anish Faustin PT, DPT Physical Therapist                  Sincerely,      ANISH FAUSTIN PT, DPT  [facility]                                                            Dear ANISH FAUSTIN PT, DPT,    RE: Ms. Mago Cleaning, MRN: [id number]    I certify that I have reviewed the attached plan of care and agree to the details within.        ___________________________  ___________________________  Patient Printed Name    Patient Signed Name      ___________________________  Date and Time

## 2025-02-18 ENCOUNTER — CLINICAL SUPPORT (OUTPATIENT)
Dept: REHABILITATION | Facility: HOSPITAL | Age: 65
End: 2025-02-18
Payer: MEDICARE

## 2025-02-18 DIAGNOSIS — R29.898 HIP TIGHTNESS: ICD-10-CM

## 2025-02-18 DIAGNOSIS — R29.898 WEAKNESS OF RIGHT LOWER EXTREMITY: Primary | ICD-10-CM

## 2025-02-18 PROCEDURE — 97014 ELECTRIC STIMULATION THERAPY: CPT | Mod: PO | Performed by: PHYSICAL THERAPIST

## 2025-02-18 PROCEDURE — 97140 MANUAL THERAPY 1/> REGIONS: CPT | Mod: PO | Performed by: PHYSICAL THERAPIST

## 2025-02-18 PROCEDURE — 97112 NEUROMUSCULAR REEDUCATION: CPT | Mod: PO | Performed by: PHYSICAL THERAPIST

## 2025-02-18 NOTE — PROGRESS NOTES
Outpatient Rehab    Physical Therapy Visit    Patient Name: Mago Cleaning  MRN: [id number]  YOB: 1960  Today's Date: 2/18/2025    Therapy Diagnosis:   Encounter Diagnoses   Name Primary?    Weakness of right lower extremity Yes    Hip tightness      Physician: Misha Barajas MD    Physician Orders: Eval and Treat  Medical Diagnosis:      R10.31 (ICD-10-CM) - Right groin pain   M47.896 (ICD-10-CM) - Other osteoarthritis of spine, lumbar region         Visit # / Visits Authorized:  1 / 1; 1/20  Date of Evaluation:  2/17/2025   Insurance Authorization Period: 1/1/2025 to 12/31/2025  Plan of Care Certification:  2/17/2025 to 4/14/2025      Time In: 1500   Time Out: 1555  Total Time: 55   Total Billable Time: 55    FOTO:  Intake Score:  %  Survey Score 1:  %  Survey Score 2:  %         Subjective   No new complaints. Just came from a massage for her R hip..  Pain reported as 0/10.      Objective            Treatment:  Manual Therapy  Manual Therapy Activity 1: DN with manual stimulation to quads candace RF and adductors  Manual Therapy Activity 2: DN with e-stim (unattended) to quads candace RF  Manual Therapy Activity 3: DN for scar mobility    Balance/Neuromuscular Re-Education  Balance/Neuromuscular Re-Education Activity 1: Patient education: diagnosis, prognosis, importance; muscle imbalances; joint capsule hypomobility; HEP  Balance/Neuromuscular Re-Education Activity 2: Bridges 5x10 sec ea; normal, adducted, abducted - cuing glutes all  Balance/Neuromuscular Re-Education Activity 3: Clam hold 5x10 sec ea ebony       NEXT VISIT:  - DN to quad  - Cont posterior chain NM activation + stability     FUTURE VISITS:  - initiate core NM activation + ER stretching       Assessment & Plan   Assessment: Improved tissue texture to quad and adductors post DN, but discomfort following scar mobilization DN. Demonstrates fair to good form with posterior chain NM activation series, but has difficulty with clam glue NM  isolation. Cont to require focus on stability training of hip and core to offload hip flexors.       Patient will continue to benefit from skilled outpatient physical therapy to address the deficits listed in the problem list box on initial evaluation, provide pt/family education and to maximize pt's level of independence in the home and community environment.     Patient's spiritual, cultural, and educational needs considered and patient agreeable to plan of care and goals.           Plan: Cont per POC    Goals:   Active       Functional outcome       Patient will show a significant change in FOTO patient-reported outcome tool to demonstrate subjective improvement (Progressing)       Start:  02/17/25    Expected End:  04/14/25            Patient stated goal: ADLs and gym w/o pain or complaint  (Progressing)       Start:  02/17/25    Expected End:  04/14/25            Patient will demonstrate independence in home program for support of progression (Progressing)       Start:  02/17/25    Expected End:  04/14/25               Pain       Patient will report a 2 point reduction in pain while performing hip flexion (Progressing)       Start:  02/17/25    Expected End:  04/14/25               Range of Motion       Patient will achieve bilateral hip external rotation ROM 55 degrees in 90 degrees hip flexion (Progressing)       Start:  02/17/25    Expected End:  04/14/25               Strength       Patient will achieve bilateral hip flexion strength of 4/5 (Progressing)       Start:  02/17/25    Expected End:  04/14/25            Patient will achieve bilateral hip extension strength of 4/5 (Progressing)       Start:  02/17/25    Expected End:  04/14/25                LORETO MARTINEZ, PT, DPT, OCS

## 2025-02-25 ENCOUNTER — PATIENT MESSAGE (OUTPATIENT)
Dept: REHABILITATION | Facility: HOSPITAL | Age: 65
End: 2025-02-25
Payer: MEDICARE

## 2025-02-28 ENCOUNTER — CLINICAL SUPPORT (OUTPATIENT)
Dept: REHABILITATION | Facility: HOSPITAL | Age: 65
End: 2025-02-28
Attending: PHYSICAL THERAPIST
Payer: MEDICARE

## 2025-02-28 DIAGNOSIS — R29.898 WEAKNESS OF RIGHT LOWER EXTREMITY: Primary | ICD-10-CM

## 2025-02-28 DIAGNOSIS — R29.898 HIP TIGHTNESS: ICD-10-CM

## 2025-02-28 PROCEDURE — 97112 NEUROMUSCULAR REEDUCATION: CPT | Mod: PO | Performed by: PHYSICAL THERAPIST

## 2025-02-28 PROCEDURE — 97140 MANUAL THERAPY 1/> REGIONS: CPT | Mod: PO | Performed by: PHYSICAL THERAPIST

## 2025-02-28 PROCEDURE — 97530 THERAPEUTIC ACTIVITIES: CPT | Mod: PO | Performed by: PHYSICAL THERAPIST

## 2025-02-28 NOTE — PROGRESS NOTES
Outpatient Rehab    Physical Therapy Visit    Patient Name: Mago Cleaning  MRN: [id number]  YOB: 1960  Encounter Date: 2/28/2025    Therapy Diagnosis:   Encounter Diagnoses   Name Primary?    Weakness of right lower extremity Yes    Hip tightness      Physician: Misha Barajas MD    Physician Orders: Eval and Treat  Medical Diagnosis:      R10.31 (ICD-10-CM) - Right groin pain   M47.896 (ICD-10-CM) - Other osteoarthritis of spine, lumbar region         Visit # / Visits Authorized:  1 / 1; 2/20  Date of Evaluation:  2/17/2025   Insurance Authorization Period: 1/1/2025 to 12/31/2025  Plan of Care Certification:  2/17/2025 to 4/14/2025     Time In: 1535   Time Out: 1630  Total Time: 55   Total Billable Time: 55    FOTO:  Intake Score:  %  Survey Score 1:  %  Survey Score 2:  %         Subjective   Reports increased R hip pain and low back pain following DN at last visit and following HEP performance of bridges and clams..  Pain reported as 5/10.      Objective      Hip Palpation  Right Hip Palpation  Abnormal: Hip Muscle  Right Hip Muscle Palpation Observations: Psoas, RF, and adductor TrP                    Lumbar Range of Motion   All WNL and non aggravating           Hip Joint Mobility  Right Hip Joint Mobility  Hypomobile: Posterior Capsule and Lateral Capsule              Treatment:  Manual Therapy  Manual Therapy Activity 3: Assessments  Manual Therapy Activity 4: STM paraspinals and R psoas    Balance/Neuromuscular Re-Education  Balance/Neuromuscular Re-Education Activity 1: HEP - glute sets supine and TrA activation  Balance/Neuromuscular Re-Education Activity 4: Glute set supine 10x10 sec  Balance/Neuromuscular Re-Education Activity 5: TrA hallowing 10x10 sec  Balance/Neuromuscular Re-Education Activity 6: Prone glute sets 10x10 sec - *pain and difficulty isolation with overuse of paraspinals  Balance/Neuromuscular Re-Education Activity 7: Hooklying glute sets (ER) 10x10 sec - *pain and  difficulty with isolation  Balance/Neuromuscular Re-Education Activity 8: K-Tape paraspinal and psoas inhibition    Therapeutic Activity  Therapeutic Activity 1: Patient education: impairments, anatomy, and importance of stability as it relates to function and presentation; Discussed and advised on use of SIJ belt to help with symptoms management for passive stabilization; Discussed activity modification and importance of not pushing through symptoms; Discussed water aerobics; Benefits of contrast therapy in short term to manage recent aggravation.      NEXT VISIT:  - stability and NM activation  - SIJ belt benefits    Assessment & Plan   Assessment: Aggravation of symptoms since last seen with with s/s of R psoas hypertonicity leading to increased lower back and R hip discomfort. This in part is likely due to HEP difficulty leading to compensatory use of Psoas and symptoms and from poor tristen and guarding after DN to scar tissue. Patient had difficulty with isolation of gluteals in a variety of positions, but tristen supine glute set and TrA activation well. Discussed in detail presentation and importance of stabilization. Also advised in possible benefits of SIJ belt for short term relief of symptoms. Patient weary of continuation of care, but will trial HEP and use of SIJ belt.  Evaluation/Treatment Tolerance: Treatment limited secondary to agitation    Patient will continue to benefit from skilled outpatient physical therapy to address the deficits listed in the problem list box on initial evaluation, provide pt/family education and to maximize pt's level of independence in the home and community environment.     Patient's spiritual, cultural, and educational needs considered and patient agreeable to plan of care and goals.           Plan: Reassess benefits of new HEP and use of SIJ    Goals:   Active       Functional outcome       Patient will show a significant change in FOTO patient-reported outcome tool to  demonstrate subjective improvement (Not Progressing)       Start:  02/17/25    Expected End:  04/14/25            Patient stated goal: ADLs and gym w/o pain or complaint  (Not Progressing)       Start:  02/17/25    Expected End:  04/14/25            Patient will demonstrate independence in home program for support of progression (Not Progressing)       Start:  02/17/25    Expected End:  04/14/25               Pain       Patient will report a 2 point reduction in pain while performing hip flexion (Not Progressing)       Start:  02/17/25    Expected End:  04/14/25               Range of Motion       Patient will achieve bilateral hip external rotation ROM 55 degrees in 90 degrees hip flexion (Not Progressing)       Start:  02/17/25    Expected End:  04/14/25               Strength       Patient will achieve bilateral hip flexion strength of 4/5 (Not Progressing)       Start:  02/17/25    Expected End:  04/14/25            Patient will achieve bilateral hip extension strength of 4/5 (Not Progressing)       Start:  02/17/25    Expected End:  04/14/25                LORETO MARTINEZ, PT, DPT, OCS

## 2025-08-07 ENCOUNTER — OFFICE VISIT (OUTPATIENT)
Dept: URGENT CARE | Facility: CLINIC | Age: 65
End: 2025-08-07
Payer: MEDICARE

## 2025-08-07 VITALS
HEIGHT: 60 IN | TEMPERATURE: 99 F | RESPIRATION RATE: 20 BRPM | BODY MASS INDEX: 28.07 KG/M2 | OXYGEN SATURATION: 98 % | WEIGHT: 143 LBS | DIASTOLIC BLOOD PRESSURE: 78 MMHG | SYSTOLIC BLOOD PRESSURE: 125 MMHG | HEART RATE: 61 BPM

## 2025-08-07 DIAGNOSIS — J06.9 URI WITH COUGH AND CONGESTION: Primary | ICD-10-CM

## 2025-08-07 LAB
CTP QC/QA: YES
CTP QC/QA: YES
POC MOLECULAR INFLUENZA A AGN: NEGATIVE
POC MOLECULAR INFLUENZA B AGN: NEGATIVE
SARS-COV+SARS-COV-2 AG RESP QL IA.RAPID: NEGATIVE

## 2025-08-07 PROCEDURE — 99213 OFFICE O/P EST LOW 20 MIN: CPT | Mod: S$GLB,,, | Performed by: FAMILY MEDICINE

## 2025-08-07 PROCEDURE — 87502 INFLUENZA DNA AMP PROBE: CPT | Mod: QW,S$GLB,, | Performed by: FAMILY MEDICINE

## 2025-08-07 PROCEDURE — 87811 SARS-COV-2 COVID19 W/OPTIC: CPT | Mod: QW,S$GLB,, | Performed by: FAMILY MEDICINE

## 2025-08-07 NOTE — PROGRESS NOTES
Subjective:      Patient ID: Mago Cleaning is a 64 y.o. female.    Vitals:  vitals were not taken for this visit.     Chief Complaint: Cough    HPI  ROS   Objective:     Physical Exam    Assessment:     No diagnosis found.    Plan:       There are no diagnoses linked to this encounter.

## 2025-08-07 NOTE — PROGRESS NOTES
Subjective:      Patient ID: Mago Cleaning is a 64 y.o. female.    Vitals:  height is 5' (1.524 m) and weight is 64.9 kg (143 lb). Her oral temperature is 99 °F (37.2 °C). Her blood pressure is 125/78 and her pulse is 61. Her respiration is 20 and oxygen saturation is 98%.     Chief Complaint: Cough    This is a 64 y.o. female who presents today with a chief complaint of  cough, congestion, and a sore throat on Monday.  Pt is having a productive cough.  She has been taking Severe Cold & Flu along with Doxycycline with no relief.     Cough  This is a new problem. The current episode started in the past 7 days (Monday). The problem has been unchanged. The problem occurs hourly. The cough is Productive of sputum. Associated symptoms include myalgias, nasal congestion, rhinorrhea and shortness of breath. Nothing aggravates the symptoms. Treatments tried: Severe Cold & Flu, Doxycycline. The treatment provided no relief.       Respiratory:  Positive for cough and shortness of breath.    Musculoskeletal:  Positive for muscle ache.      Objective:     Physical Exam   Constitutional: She does not appear ill. No distress. normal  HENT:   Head: Normocephalic and atraumatic.   Nose: Congestion present. No rhinorrhea.   Mouth/Throat: Mucous membranes are moist. Posterior oropharyngeal erythema present. No oropharyngeal exudate.   Neck: Neck supple.   Cardiovascular: Normal rate, regular rhythm, normal heart sounds and normal pulses.   Pulmonary/Chest: Effort normal and breath sounds normal.   Abdominal: Normal appearance. Soft.   Lymphadenopathy:     She has no cervical adenopathy.   Neurological: She is alert.   Nursing note and vitals reviewed.    Assessment:     1. URI with cough and congestion        Plan:       URI with cough and congestion  -     SARS Coronavirus 2 Antigen, POCT Manual Read  -     POCT Influenza A/B Molecular    Discussed OTC cough and cold remedies

## (undated) DEVICE — SOL NACL STRL BOTTLE 1000ML

## (undated) DEVICE — GOWN B1 X-LG X-LONG

## (undated) DEVICE — DRESSING N ADH OIL EMUL 3X8

## (undated) DEVICE — SUT VICRYL+ 1 CT1 18IN

## (undated) DEVICE — SPONGE COTTON TRAY 4X4IN

## (undated) DEVICE — TAPE MEDIPORE 3 X 10YD

## (undated) DEVICE — DRAPE STERI-DRAPE 83X125 IOBAN

## (undated) DEVICE — GLOVE BIOGEL SKINSENSE PI 7.0

## (undated) DEVICE — COVER BACK TABLE 72X21

## (undated) DEVICE — TAPE SILK 3IN

## (undated) DEVICE — ELECTRODE REM PLYHSV RETURN 9

## (undated) DEVICE — NDL 18GA X1 1/2 REG BEVEL

## (undated) DEVICE — PAD ABD 8X10 STERILE

## (undated) DEVICE — GLOVE BIOGEL SKINSENSE PI 8.5

## (undated) DEVICE — ELECTRODE BLADE TEFLON 6

## (undated) DEVICE — SUT VICRYL 2-0 8-18 CP-2

## (undated) DEVICE — DRAPE INCISE IOBAN 2 23X17IN

## (undated) DEVICE — BLANKET MAXI-THERM PED 25X33IN

## (undated) DEVICE — CLOSURE SKIN STERI STRIP 1/2X4

## (undated) DEVICE — DRESSING ADAPTIC N ADH 3X8IN

## (undated) DEVICE — Device

## (undated) DEVICE — APPLICATOR CHLORAPREP ORN 26ML

## (undated) DEVICE — TRAY FOLEY 16FR INFECTION CONT

## (undated) DEVICE — MASK FLYTE HOOD PEEL AWAY

## (undated) DEVICE — POSITIONER IV ARMBOARD FOAM

## (undated) DEVICE — BLADE SAW SGTL NARROW 0.89

## (undated) DEVICE — SOL PVP-I SCRUB 7.5% 4OZ

## (undated) DEVICE — GLOVE BIOGEL SKINSENSE PI 6.5

## (undated) DEVICE — TIP YANKAUERS BULB NO VENT

## (undated) DEVICE — DRAPE C ARM 42 X 120 10/BX

## (undated) DEVICE — SPONGE GAUZE 16PLY 4X4

## (undated) DEVICE — UNDERGLOVE BIOGEL PI SZ 6.5 LF